# Patient Record
Sex: FEMALE | Race: WHITE | Employment: PART TIME | URBAN - NONMETROPOLITAN AREA
[De-identification: names, ages, dates, MRNs, and addresses within clinical notes are randomized per-mention and may not be internally consistent; named-entity substitution may affect disease eponyms.]

---

## 2019-10-02 ENCOUNTER — HOSPITAL ENCOUNTER (EMERGENCY)
Age: 64
Discharge: HOME | End: 2019-10-02
Attending: EMERGENCY MEDICINE

## 2019-10-02 VITALS
TEMPERATURE: 97.8 F | HEIGHT: 60 IN | SYSTOLIC BLOOD PRESSURE: 186 MMHG | OXYGEN SATURATION: 98 % | BODY MASS INDEX: 33.48 KG/M2 | HEART RATE: 93 BPM | DIASTOLIC BLOOD PRESSURE: 98 MMHG | RESPIRATION RATE: 16 BRPM

## 2019-10-02 DIAGNOSIS — B02.8 HERPES ZOSTER WITH OTHER COMPLICATION: Primary | ICD-10-CM

## 2019-10-02 PROCEDURE — 99282 EMERGENCY DEPT VISIT SF MDM: CPT

## 2019-10-02 RX ORDER — CEPHALEXIN 500 MG/1
500 CAPSULE ORAL 2 TIMES DAILY
Qty: 14 CAPSULE | Refills: 0 | Status: SHIPPED | OUTPATIENT
Start: 2019-10-02 | End: 2019-10-09

## 2019-10-02 RX ORDER — OXYCODONE HYDROCHLORIDE AND ACETAMINOPHEN 5; 325 MG/1; MG/1
1 TABLET ORAL EVERY 6 HOURS PRN
Qty: 12 TABLET | Refills: 0 | Status: SHIPPED | OUTPATIENT
Start: 2019-10-02 | End: 2019-10-05

## 2019-10-02 RX ORDER — GABAPENTIN 100 MG/1
100 CAPSULE ORAL 3 TIMES DAILY
Qty: 90 CAPSULE | Refills: 0 | Status: SHIPPED | OUTPATIENT
Start: 2019-10-02 | End: 2019-11-01

## 2019-10-02 RX ORDER — PREDNISONE 20 MG/1
20 TABLET ORAL DAILY
Qty: 5 TABLET | Refills: 0 | Status: SHIPPED | OUTPATIENT
Start: 2019-10-02 | End: 2019-10-07

## 2019-10-02 RX ORDER — VALACYCLOVIR HYDROCHLORIDE 1 G/1
1000 TABLET, FILM COATED ORAL 2 TIMES DAILY
Qty: 20 TABLET | Refills: 0 | Status: SHIPPED | OUTPATIENT
Start: 2019-10-02 | End: 2019-10-12

## 2019-10-02 ASSESSMENT — PAIN DESCRIPTION - PAIN TYPE: TYPE: ACUTE PAIN

## 2019-10-02 ASSESSMENT — PAIN DESCRIPTION - LOCATION: LOCATION: BACK;CHEST

## 2019-10-02 ASSESSMENT — PAIN DESCRIPTION - ORIENTATION: ORIENTATION: RIGHT

## 2019-10-02 ASSESSMENT — PAIN DESCRIPTION - DESCRIPTORS: DESCRIPTORS: SHARP;BURNING

## 2019-10-02 ASSESSMENT — PAIN SCALES - GENERAL: PAINLEVEL_OUTOF10: 10

## 2022-08-20 ENCOUNTER — APPOINTMENT (OUTPATIENT)
Dept: CT IMAGING | Age: 67
End: 2022-08-20

## 2022-08-20 ENCOUNTER — APPOINTMENT (OUTPATIENT)
Dept: GENERAL RADIOLOGY | Age: 67
DRG: 064 | End: 2022-08-20
Attending: INTERNAL MEDICINE

## 2022-08-20 ENCOUNTER — HOSPITAL ENCOUNTER (INPATIENT)
Age: 67
LOS: 3 days | DRG: 064 | End: 2022-08-23
Attending: INTERNAL MEDICINE | Admitting: INTERNAL MEDICINE

## 2022-08-20 ENCOUNTER — APPOINTMENT (OUTPATIENT)
Dept: CT IMAGING | Age: 67
DRG: 064 | End: 2022-08-20
Attending: INTERNAL MEDICINE

## 2022-08-20 ENCOUNTER — HOSPITAL ENCOUNTER (EMERGENCY)
Age: 67
Discharge: ANOTHER HOSPITAL | End: 2022-08-20
Attending: EMERGENCY MEDICINE

## 2022-08-20 ENCOUNTER — APPOINTMENT (OUTPATIENT)
Dept: GENERAL RADIOLOGY | Age: 67
End: 2022-08-20

## 2022-08-20 VITALS
RESPIRATION RATE: 16 BRPM | HEART RATE: 93 BPM | TEMPERATURE: 96.8 F | SYSTOLIC BLOOD PRESSURE: 132 MMHG | DIASTOLIC BLOOD PRESSURE: 95 MMHG | OXYGEN SATURATION: 96 %

## 2022-08-20 DIAGNOSIS — J81.0 ACUTE PULMONARY EDEMA (HCC): ICD-10-CM

## 2022-08-20 DIAGNOSIS — J96.01 ACUTE RESPIRATORY FAILURE WITH HYPOXIA AND HYPERCAPNIA (HCC): ICD-10-CM

## 2022-08-20 DIAGNOSIS — J96.02 ACUTE RESPIRATORY FAILURE WITH HYPOXIA AND HYPERCAPNIA (HCC): ICD-10-CM

## 2022-08-20 DIAGNOSIS — I62.9 INTRACRANIAL HEMORRHAGE (HCC): Primary | ICD-10-CM

## 2022-08-20 DIAGNOSIS — I21.3 ST ELEVATION MYOCARDIAL INFARCTION (STEMI), UNSPECIFIED ARTERY (HCC): ICD-10-CM

## 2022-08-20 PROBLEM — I63.9 ACUTE CEREBROVASCULAR ACCIDENT (CVA) OF BASAL GANGLIA (HCC): Status: ACTIVE | Noted: 2022-08-20

## 2022-08-20 LAB
A/G RATIO: 1.2 (ref 1.1–2.2)
ALBUMIN SERPL-MCNC: 4.3 G/DL (ref 3.4–5)
ALP BLD-CCNC: 80 U/L (ref 40–129)
ALT SERPL-CCNC: 41 U/L (ref 10–40)
AMORPHOUS: ABNORMAL /HPF
AMPHETAMINE SCREEN, URINE: NORMAL
ANION GAP SERPL CALCULATED.3IONS-SCNC: 9 MMOL/L (ref 3–16)
APTT: 24.9 SEC (ref 23–34.3)
AST SERPL-CCNC: 27 U/L (ref 15–37)
BACTERIA: ABNORMAL /HPF
BARBITURATE SCREEN URINE: NORMAL
BASE EXCESS ARTERIAL: -3.4 MMOL/L (ref -3–3)
BASE EXCESS ARTERIAL: 1 MMOL/L (ref -3–3)
BASE EXCESS ARTERIAL: 1.8 MMOL/L (ref -3–3)
BASE EXCESS VENOUS: -5.8 MMOL/L (ref -3–3)
BENZODIAZEPINE SCREEN, URINE: NORMAL
BILIRUB SERPL-MCNC: 0.7 MG/DL (ref 0–1)
BILIRUBIN URINE: NEGATIVE
BLOOD, URINE: ABNORMAL
BUN BLDV-MCNC: 23 MG/DL (ref 7–20)
CALCIUM SERPL-MCNC: 9.2 MG/DL (ref 8.3–10.6)
CANNABINOID SCREEN URINE: NORMAL
CARBOXYHEMOGLOBIN ARTERIAL: 1.5 % (ref 0–1.5)
CARBOXYHEMOGLOBIN ARTERIAL: 2.3 % (ref 0–1.5)
CARBOXYHEMOGLOBIN ARTERIAL: 2.9 % (ref 0–1.5)
CARBOXYHEMOGLOBIN: 4 % (ref 0–1.5)
CHLORIDE BLD-SCNC: 103 MMOL/L (ref 99–110)
CLARITY: CLEAR
CO2: 30 MMOL/L (ref 21–32)
COCAINE METABOLITE SCREEN URINE: NORMAL
COLOR: YELLOW
CREAT SERPL-MCNC: 0.8 MG/DL (ref 0.6–1.2)
EKG ATRIAL RATE: 132 BPM
EKG ATRIAL RATE: 95 BPM
EKG DIAGNOSIS: NORMAL
EKG DIAGNOSIS: NORMAL
EKG P AXIS: 52 DEGREES
EKG P AXIS: 91 DEGREES
EKG P-R INTERVAL: 126 MS
EKG P-R INTERVAL: 130 MS
EKG Q-T INTERVAL: 338 MS
EKG Q-T INTERVAL: 396 MS
EKG QRS DURATION: 90 MS
EKG QRS DURATION: 96 MS
EKG QTC CALCULATION (BAZETT): 497 MS
EKG QTC CALCULATION (BAZETT): 500 MS
EKG R AXIS: 88 DEGREES
EKG R AXIS: 90 DEGREES
EKG T AXIS: 79 DEGREES
EKG T AXIS: 88 DEGREES
EKG VENTRICULAR RATE: 132 BPM
EKG VENTRICULAR RATE: 95 BPM
EPITHELIAL CELLS, UA: ABNORMAL /HPF (ref 0–5)
ETHANOL: NORMAL MG/DL (ref 0–0.08)
GFR AFRICAN AMERICAN: >60
GFR NON-AFRICAN AMERICAN: >60
GLUCOSE BLD-MCNC: 110 MG/DL (ref 70–99)
GLUCOSE BLD-MCNC: 123 MG/DL (ref 70–99)
GLUCOSE BLD-MCNC: 258 MG/DL (ref 70–99)
GLUCOSE BLD-MCNC: 97 MG/DL (ref 70–99)
GLUCOSE URINE: 250 MG/DL
HCO3 ARTERIAL: 25.3 MMOL/L (ref 21–29)
HCO3 ARTERIAL: 26.6 MMOL/L (ref 21–29)
HCO3 ARTERIAL: 30 MMOL/L (ref 21–29)
HCO3 VENOUS: 27.5 MMOL/L (ref 23–29)
HCT VFR BLD CALC: 49.7 % (ref 36–48)
HEMOGLOBIN, ART, EXTENDED: 15.2 G/DL (ref 12–16)
HEMOGLOBIN, ART, EXTENDED: 15.9 G/DL (ref 12–16)
HEMOGLOBIN, ART, EXTENDED: 16.6 G/DL
HEMOGLOBIN: 15.8 G/DL (ref 12–16)
INR BLD: 0.98 (ref 0.87–1.14)
INR BLD: 0.98 (ref 0.87–1.14)
KETONES, URINE: NEGATIVE MG/DL
LACTIC ACID: 1.4 MMOL/L (ref 0.4–2)
LACTIC ACID: 2.1 MMOL/L (ref 0.4–2)
LEUKOCYTE ESTERASE, URINE: NEGATIVE
Lab: NORMAL
MAGNESIUM: 1.4 MG/DL (ref 1.8–2.4)
MCH RBC QN AUTO: 24.9 PG (ref 26–34)
MCHC RBC AUTO-ENTMCNC: 31.8 G/DL (ref 31–36)
MCV RBC AUTO: 78.3 FL (ref 80–100)
METHADONE SCREEN, URINE: NORMAL
METHEMOGLOBIN ARTERIAL: 0.1 %
METHEMOGLOBIN ARTERIAL: 0.1 % (ref 0–1.4)
METHEMOGLOBIN ARTERIAL: 0.3 %
METHEMOGLOBIN VENOUS: 0 %
MICROSCOPIC EXAMINATION: YES
NITRITE, URINE: NEGATIVE
O2 SAT, ARTERIAL: 95 % (ref 93–100)
O2 SAT, ARTERIAL: 95.2 %
O2 SAT, ARTERIAL: 99.7 %
O2 SAT, VEN: 79 %
O2 THERAPY: ABNORMAL
OPIATE SCREEN URINE: NORMAL
OXYCODONE URINE: NORMAL
PCO2 ARTERIAL: 45.4 MMHG (ref 35–45)
PCO2 ARTERIAL: 60.1 MMHG (ref 35–45)
PCO2 ARTERIAL: 61 MMHG (ref 35–45)
PCO2, VEN: 94.7 MMHG (ref 40–50)
PDW BLD-RTO: 16.2 % (ref 12.4–15.4)
PERFORMED ON: ABNORMAL
PERFORMED ON: ABNORMAL
PERFORMED ON: NORMAL
PH ARTERIAL: 7.24 (ref 7.35–7.45)
PH ARTERIAL: 7.31 (ref 7.35–7.45)
PH ARTERIAL: 7.38 (ref 7.35–7.45)
PH UA: 6.5
PH UA: 6.5 (ref 5–8)
PH VENOUS: 7.08 (ref 7.35–7.45)
PHENCYCLIDINE SCREEN URINE: NORMAL
PLATELET # BLD: 287 K/UL (ref 135–450)
PMV BLD AUTO: 7.7 FL (ref 5–10.5)
PO2 ARTERIAL: 351.5 MMHG (ref 75–108)
PO2 ARTERIAL: 77.5 MMHG (ref 75–108)
PO2 ARTERIAL: 85.7 MMHG (ref 75–108)
PO2, VEN: 59.9 MMHG (ref 25–40)
POTASSIUM REFLEX MAGNESIUM: 4.6 MMOL/L (ref 3.5–5.1)
PRO-BNP: ABNORMAL PG/ML (ref 0–124)
PROCALCITONIN: 0.26 NG/ML (ref 0–0.15)
PROPOXYPHENE SCREEN: NORMAL
PROTEIN UA: 100 MG/DL
PROTHROMBIN TIME: 12.8 SEC (ref 11.7–14.5)
PROTHROMBIN TIME: 12.9 SEC (ref 11.7–14.5)
RBC # BLD: 6.35 M/UL (ref 4–5.2)
RBC UA: ABNORMAL /HPF (ref 0–4)
SARS-COV-2, NAAT: NOT DETECTED
SODIUM BLD-SCNC: 142 MMOL/L (ref 136–145)
SPECIFIC GRAVITY UA: >=1.03 (ref 1–1.03)
TCO2 ARTERIAL: 27.2 MMOL/L
TCO2 ARTERIAL: 28 MMOL/L
TCO2 ARTERIAL: 32 MMOL/L
TCO2 CALC VENOUS: 30 MMOL/L
TOTAL CK: 45 U/L (ref 26–192)
TOTAL PROTEIN: 7.8 G/DL (ref 6.4–8.2)
TROPONIN: 0.05 NG/ML
TROPONIN: 0.07 NG/ML
TROPONIN: 0.54 NG/ML
TROPONIN: 1.08 NG/ML
URINE REFLEX TO CULTURE: ABNORMAL
URINE TYPE: ABNORMAL
UROBILINOGEN, URINE: 0.2 E.U./DL
WBC # BLD: 13 K/UL (ref 4–11)
WBC UA: ABNORMAL /HPF (ref 0–5)

## 2022-08-20 PROCEDURE — 82550 ASSAY OF CK (CPK): CPT

## 2022-08-20 PROCEDURE — 99255 IP/OBS CONSLTJ NEW/EST HI 80: CPT | Performed by: INTERNAL MEDICINE

## 2022-08-20 PROCEDURE — 6370000000 HC RX 637 (ALT 250 FOR IP): Performed by: EMERGENCY MEDICINE

## 2022-08-20 PROCEDURE — 6370000000 HC RX 637 (ALT 250 FOR IP): Performed by: STUDENT IN AN ORGANIZED HEALTH CARE EDUCATION/TRAINING PROGRAM

## 2022-08-20 PROCEDURE — 85610 PROTHROMBIN TIME: CPT

## 2022-08-20 PROCEDURE — 83880 ASSAY OF NATRIURETIC PEPTIDE: CPT

## 2022-08-20 PROCEDURE — 82077 ASSAY SPEC XCP UR&BREATH IA: CPT

## 2022-08-20 PROCEDURE — 2500000003 HC RX 250 WO HCPCS: Performed by: EMERGENCY MEDICINE

## 2022-08-20 PROCEDURE — 71045 X-RAY EXAM CHEST 1 VIEW: CPT

## 2022-08-20 PROCEDURE — 0BH17EZ INSERTION OF ENDOTRACHEAL AIRWAY INTO TRACHEA, VIA NATURAL OR ARTIFICIAL OPENING: ICD-10-PCS | Performed by: INTERNAL MEDICINE

## 2022-08-20 PROCEDURE — 2500000003 HC RX 250 WO HCPCS

## 2022-08-20 PROCEDURE — 2000000000 HC ICU R&B

## 2022-08-20 PROCEDURE — 83605 ASSAY OF LACTIC ACID: CPT

## 2022-08-20 PROCEDURE — 81001 URINALYSIS AUTO W/SCOPE: CPT

## 2022-08-20 PROCEDURE — 6360000002 HC RX W HCPCS

## 2022-08-20 PROCEDURE — 2500000003 HC RX 250 WO HCPCS: Performed by: STUDENT IN AN ORGANIZED HEALTH CARE EDUCATION/TRAINING PROGRAM

## 2022-08-20 PROCEDURE — 85027 COMPLETE CBC AUTOMATED: CPT

## 2022-08-20 PROCEDURE — 84484 ASSAY OF TROPONIN QUANT: CPT

## 2022-08-20 PROCEDURE — 5A1945Z RESPIRATORY VENTILATION, 24-96 CONSECUTIVE HOURS: ICD-10-PCS | Performed by: INTERNAL MEDICINE

## 2022-08-20 PROCEDURE — 93010 ELECTROCARDIOGRAM REPORT: CPT | Performed by: INTERNAL MEDICINE

## 2022-08-20 PROCEDURE — 93005 ELECTROCARDIOGRAM TRACING: CPT | Performed by: EMERGENCY MEDICINE

## 2022-08-20 PROCEDURE — 80053 COMPREHEN METABOLIC PANEL: CPT

## 2022-08-20 PROCEDURE — 80307 DRUG TEST PRSMV CHEM ANLYZR: CPT

## 2022-08-20 PROCEDURE — 6360000002 HC RX W HCPCS: Performed by: STUDENT IN AN ORGANIZED HEALTH CARE EDUCATION/TRAINING PROGRAM

## 2022-08-20 PROCEDURE — 70450 CT HEAD/BRAIN W/O DYE: CPT

## 2022-08-20 PROCEDURE — 51702 INSERT TEMP BLADDER CATH: CPT

## 2022-08-20 PROCEDURE — 6360000004 HC RX CONTRAST MEDICATION: Performed by: STUDENT IN AN ORGANIZED HEALTH CARE EDUCATION/TRAINING PROGRAM

## 2022-08-20 PROCEDURE — 94761 N-INVAS EAR/PLS OXIMETRY MLT: CPT

## 2022-08-20 PROCEDURE — 84145 PROCALCITONIN (PCT): CPT

## 2022-08-20 PROCEDURE — 31500 INSERT EMERGENCY AIRWAY: CPT

## 2022-08-20 PROCEDURE — 36415 COLL VENOUS BLD VENIPUNCTURE: CPT

## 2022-08-20 PROCEDURE — APPNB60 APP NON BILLABLE TIME 46-60 MINS: Performed by: NURSE PRACTITIONER

## 2022-08-20 PROCEDURE — 94640 AIRWAY INHALATION TREATMENT: CPT

## 2022-08-20 PROCEDURE — 82803 BLOOD GASES ANY COMBINATION: CPT

## 2022-08-20 PROCEDURE — 83735 ASSAY OF MAGNESIUM: CPT

## 2022-08-20 PROCEDURE — 99285 EMERGENCY DEPT VISIT HI MDM: CPT

## 2022-08-20 PROCEDURE — 2580000003 HC RX 258: Performed by: EMERGENCY MEDICINE

## 2022-08-20 PROCEDURE — 87635 SARS-COV-2 COVID-19 AMP PRB: CPT

## 2022-08-20 PROCEDURE — 70496 CT ANGIOGRAPHY HEAD: CPT

## 2022-08-20 PROCEDURE — 89220 SPUTUM SPECIMEN COLLECTION: CPT

## 2022-08-20 PROCEDURE — 94002 VENT MGMT INPAT INIT DAY: CPT

## 2022-08-20 PROCEDURE — 2700000000 HC OXYGEN THERAPY PER DAY

## 2022-08-20 PROCEDURE — 6360000002 HC RX W HCPCS: Performed by: EMERGENCY MEDICINE

## 2022-08-20 PROCEDURE — 2580000003 HC RX 258: Performed by: STUDENT IN AN ORGANIZED HEALTH CARE EDUCATION/TRAINING PROGRAM

## 2022-08-20 PROCEDURE — 85730 THROMBOPLASTIN TIME PARTIAL: CPT

## 2022-08-20 PROCEDURE — 96365 THER/PROPH/DIAG IV INF INIT: CPT

## 2022-08-20 PROCEDURE — 74018 RADEX ABDOMEN 1 VIEW: CPT

## 2022-08-20 RX ORDER — SUCCINYLCHOLINE CHLORIDE 20 MG/ML
INJECTION INTRAMUSCULAR; INTRAVENOUS
Status: DISCONTINUED
Start: 2022-08-20 | End: 2022-08-20

## 2022-08-20 RX ORDER — 0.9 % SODIUM CHLORIDE 0.9 %
1000 INTRAVENOUS SOLUTION INTRAVENOUS ONCE
Status: COMPLETED | OUTPATIENT
Start: 2022-08-20 | End: 2022-08-20

## 2022-08-20 RX ORDER — ONDANSETRON 4 MG/1
4 TABLET, ORALLY DISINTEGRATING ORAL EVERY 8 HOURS PRN
Status: DISCONTINUED | OUTPATIENT
Start: 2022-08-20 | End: 2022-08-23 | Stop reason: HOSPADM

## 2022-08-20 RX ORDER — HEPARIN SODIUM 10000 [USP'U]/100ML
INJECTION, SOLUTION INTRAVENOUS
Status: DISCONTINUED
Start: 2022-08-20 | End: 2022-08-20

## 2022-08-20 RX ORDER — MANNITOL 20 G/100ML
25 INJECTION, SOLUTION INTRAVENOUS ONCE
Status: COMPLETED | OUTPATIENT
Start: 2022-08-20 | End: 2022-08-20

## 2022-08-20 RX ORDER — IPRATROPIUM BROMIDE AND ALBUTEROL SULFATE 2.5; .5 MG/3ML; MG/3ML
1 SOLUTION RESPIRATORY (INHALATION) 4 TIMES DAILY
Status: DISCONTINUED | OUTPATIENT
Start: 2022-08-20 | End: 2022-08-22

## 2022-08-20 RX ORDER — ETOMIDATE 2 MG/ML
INJECTION INTRAVENOUS
Status: DISCONTINUED
Start: 2022-08-20 | End: 2022-08-20 | Stop reason: HOSPADM

## 2022-08-20 RX ORDER — SODIUM CHLORIDE 9 MG/ML
INJECTION, SOLUTION INTRAVENOUS PRN
Status: DISCONTINUED | OUTPATIENT
Start: 2022-08-20 | End: 2022-08-23 | Stop reason: HOSPADM

## 2022-08-20 RX ORDER — DEXTROSE MONOHYDRATE 25 G/50ML
12.5 INJECTION, SOLUTION INTRAVENOUS PRN
Status: DISCONTINUED | OUTPATIENT
Start: 2022-08-20 | End: 2022-08-20

## 2022-08-20 RX ORDER — POLYETHYLENE GLYCOL 3350 17 G/17G
17 POWDER, FOR SOLUTION ORAL DAILY PRN
Status: DISCONTINUED | OUTPATIENT
Start: 2022-08-20 | End: 2022-08-23 | Stop reason: HOSPADM

## 2022-08-20 RX ORDER — INSULIN LISPRO 100 [IU]/ML
0-4 INJECTION, SOLUTION INTRAVENOUS; SUBCUTANEOUS EVERY 6 HOURS
Status: DISCONTINUED | OUTPATIENT
Start: 2022-08-20 | End: 2022-08-23 | Stop reason: HOSPADM

## 2022-08-20 RX ORDER — METOPROLOL TARTRATE 5 MG/5ML
2.5 INJECTION INTRAVENOUS EVERY 4 HOURS
Status: DISCONTINUED | OUTPATIENT
Start: 2022-08-20 | End: 2022-08-23 | Stop reason: HOSPADM

## 2022-08-20 RX ORDER — DEXAMETHASONE SODIUM PHOSPHATE 4 MG/ML
10 INJECTION, SOLUTION INTRA-ARTICULAR; INTRALESIONAL; INTRAMUSCULAR; INTRAVENOUS; SOFT TISSUE ONCE
Status: COMPLETED | OUTPATIENT
Start: 2022-08-20 | End: 2022-08-20

## 2022-08-20 RX ORDER — HEPARIN SODIUM 5000 [USP'U]/ML
INJECTION, SOLUTION INTRAVENOUS; SUBCUTANEOUS
Status: DISCONTINUED
Start: 2022-08-20 | End: 2022-08-20

## 2022-08-20 RX ORDER — ATORVASTATIN CALCIUM 40 MG/1
40 TABLET, FILM COATED ORAL NIGHTLY
Status: DISCONTINUED | OUTPATIENT
Start: 2022-08-20 | End: 2022-08-23 | Stop reason: HOSPADM

## 2022-08-20 RX ORDER — ACETAMINOPHEN 325 MG/1
650 TABLET ORAL EVERY 6 HOURS PRN
Status: DISCONTINUED | OUTPATIENT
Start: 2022-08-20 | End: 2022-08-23 | Stop reason: HOSPADM

## 2022-08-20 RX ORDER — ONDANSETRON 2 MG/ML
4 INJECTION INTRAMUSCULAR; INTRAVENOUS EVERY 6 HOURS PRN
Status: DISCONTINUED | OUTPATIENT
Start: 2022-08-20 | End: 2022-08-23 | Stop reason: HOSPADM

## 2022-08-20 RX ORDER — MANNITOL 20 G/100ML
25 INJECTION, SOLUTION INTRAVENOUS ONCE
Status: COMPLETED | OUTPATIENT
Start: 2022-08-21 | End: 2022-08-21

## 2022-08-20 RX ORDER — DEXTROSE MONOHYDRATE 100 MG/ML
INJECTION, SOLUTION INTRAVENOUS CONTINUOUS PRN
Status: DISCONTINUED | OUTPATIENT
Start: 2022-08-20 | End: 2022-08-23 | Stop reason: HOSPADM

## 2022-08-20 RX ORDER — ROCURONIUM BROMIDE 10 MG/ML
INJECTION, SOLUTION INTRAVENOUS
Status: DISCONTINUED
Start: 2022-08-20 | End: 2022-08-20 | Stop reason: HOSPADM

## 2022-08-20 RX ORDER — ASPIRIN 300 MG/1
300 SUPPOSITORY RECTAL ONCE
Status: DISCONTINUED | OUTPATIENT
Start: 2022-08-20 | End: 2022-08-20

## 2022-08-20 RX ORDER — FUROSEMIDE 10 MG/ML
20 INJECTION INTRAMUSCULAR; INTRAVENOUS ONCE
Status: COMPLETED | OUTPATIENT
Start: 2022-08-20 | End: 2022-08-20

## 2022-08-20 RX ORDER — DEXAMETHASONE SODIUM PHOSPHATE 4 MG/ML
4 INJECTION, SOLUTION INTRA-ARTICULAR; INTRALESIONAL; INTRAMUSCULAR; INTRAVENOUS; SOFT TISSUE EVERY 6 HOURS
Status: DISCONTINUED | OUTPATIENT
Start: 2022-08-20 | End: 2022-08-22

## 2022-08-20 RX ORDER — ACETAMINOPHEN 650 MG/1
650 SUPPOSITORY RECTAL EVERY 6 HOURS PRN
Status: DISCONTINUED | OUTPATIENT
Start: 2022-08-20 | End: 2022-08-23 | Stop reason: HOSPADM

## 2022-08-20 RX ORDER — CIPROFLOXACIN HYDROCHLORIDE 3.5 MG/ML
2 SOLUTION/ DROPS TOPICAL ONCE
Status: COMPLETED | OUTPATIENT
Start: 2022-08-20 | End: 2022-08-20

## 2022-08-20 RX ORDER — SODIUM CHLORIDE 0.9 % (FLUSH) 0.9 %
5-40 SYRINGE (ML) INJECTION EVERY 12 HOURS SCHEDULED
Status: DISCONTINUED | OUTPATIENT
Start: 2022-08-20 | End: 2022-08-23 | Stop reason: HOSPADM

## 2022-08-20 RX ORDER — SODIUM CHLORIDE 0.9 % (FLUSH) 0.9 %
5-40 SYRINGE (ML) INJECTION PRN
Status: DISCONTINUED | OUTPATIENT
Start: 2022-08-20 | End: 2022-08-23 | Stop reason: HOSPADM

## 2022-08-20 RX ORDER — MAGNESIUM SULFATE IN WATER 40 MG/ML
2000 INJECTION, SOLUTION INTRAVENOUS
Status: COMPLETED | OUTPATIENT
Start: 2022-08-20 | End: 2022-08-21

## 2022-08-20 RX ADMIN — METOPROLOL TARTRATE 2.5 MG: 5 INJECTION, SOLUTION INTRAVENOUS at 20:10

## 2022-08-20 RX ADMIN — CIPROFLOXACIN 2 DROP: 3 SOLUTION OPHTHALMIC at 06:26

## 2022-08-20 RX ADMIN — FUROSEMIDE 20 MG: 10 INJECTION, SOLUTION INTRAMUSCULAR; INTRAVENOUS at 18:44

## 2022-08-20 RX ADMIN — MAGNESIUM SULFATE HEPTAHYDRATE 2000 MG: 2 INJECTION, SOLUTION INTRAVENOUS at 23:19

## 2022-08-20 RX ADMIN — DEXAMETHASONE SODIUM PHOSPHATE 4 MG: 4 INJECTION, SOLUTION INTRAMUSCULAR; INTRAVENOUS at 18:44

## 2022-08-20 RX ADMIN — IPRATROPIUM BROMIDE AND ALBUTEROL SULFATE 1 AMPULE: 2.5; .5 SOLUTION RESPIRATORY (INHALATION) at 20:17

## 2022-08-20 RX ADMIN — SODIUM CHLORIDE 1000 ML: 9 INJECTION, SOLUTION INTRAVENOUS at 05:46

## 2022-08-20 RX ADMIN — METOPROLOL TARTRATE 2.5 MG: 5 INJECTION, SOLUTION INTRAVENOUS at 13:51

## 2022-08-20 RX ADMIN — LEVETIRACETAM 1000 MG: 100 INJECTION, SOLUTION, CONCENTRATE INTRAVENOUS at 05:54

## 2022-08-20 RX ADMIN — NICARDIPINE HYDROCHLORIDE 3 MG/HR: 2.5 INJECTION, SOLUTION INTRAVENOUS at 08:30

## 2022-08-20 RX ADMIN — METOPROLOL TARTRATE 2.5 MG: 5 INJECTION, SOLUTION INTRAVENOUS at 18:43

## 2022-08-20 RX ADMIN — NICARDIPINE HYDROCHLORIDE 3 MG/HR: 2.5 INJECTION, SOLUTION INTRAVENOUS at 16:36

## 2022-08-20 RX ADMIN — SODIUM CHLORIDE 1000 MG: 9 INJECTION, SOLUTION INTRAVENOUS at 22:26

## 2022-08-20 RX ADMIN — DEXTROSE MONOHYDRATE 2.5 MG/HR: 50 INJECTION, SOLUTION INTRAVENOUS at 05:46

## 2022-08-20 RX ADMIN — ATORVASTATIN CALCIUM 40 MG: 40 TABLET, FILM COATED ORAL at 20:02

## 2022-08-20 RX ADMIN — IPRATROPIUM BROMIDE AND ALBUTEROL SULFATE 1 AMPULE: 2.5; .5 SOLUTION RESPIRATORY (INHALATION) at 15:21

## 2022-08-20 RX ADMIN — SODIUM CHLORIDE, PRESERVATIVE FREE 10 ML: 5 INJECTION INTRAVENOUS at 20:10

## 2022-08-20 RX ADMIN — MANNITOL 25 G: 20 INJECTION, SOLUTION INTRAVENOUS at 19:32

## 2022-08-20 RX ADMIN — MAGNESIUM SULFATE HEPTAHYDRATE 2000 MG: 2 INJECTION, SOLUTION INTRAVENOUS at 20:09

## 2022-08-20 RX ADMIN — IOPAMIDOL 100 ML: 755 INJECTION, SOLUTION INTRAVENOUS at 15:18

## 2022-08-20 RX ADMIN — DEXAMETHASONE SODIUM PHOSPHATE 10 MG: 4 INJECTION, SOLUTION INTRAMUSCULAR; INTRAVENOUS at 13:51

## 2022-08-20 RX ADMIN — SODIUM CHLORIDE, PRESERVATIVE FREE 10 ML: 5 INJECTION INTRAVENOUS at 09:39

## 2022-08-20 ASSESSMENT — ENCOUNTER SYMPTOMS
APNEA: 0
ANAL BLEEDING: 0
ABDOMINAL PAIN: 0
SHORTNESS OF BREATH: 1
ABDOMINAL DISTENTION: 0
EYE REDNESS: 1
EYE DISCHARGE: 1

## 2022-08-20 ASSESSMENT — PULMONARY FUNCTION TESTS
PIF_VALUE: 21
PIF_VALUE: 22
PIF_VALUE: 24
PIF_VALUE: 26
PIF_VALUE: 25
PIF_VALUE: 24
PIF_VALUE: 26
PIF_VALUE: 25
PIF_VALUE: 23
PIF_VALUE: 24
PIF_VALUE: 21
PIF_VALUE: 24
PIF_VALUE: 22
PIF_VALUE: 24
PIF_VALUE: 24
PIF_VALUE: 25
PIF_VALUE: 24
PIF_VALUE: 25
PIF_VALUE: 23
PIF_VALUE: 25
PIF_VALUE: 23
PIF_VALUE: 24
PIF_VALUE: 25
PIF_VALUE: 23
PIF_VALUE: 25
PIF_VALUE: 24
PIF_VALUE: 24
PIF_VALUE: 21
PIF_VALUE: 21
PIF_VALUE: 24

## 2022-08-20 ASSESSMENT — PAIN - FUNCTIONAL ASSESSMENT: PAIN_FUNCTIONAL_ASSESSMENT: NONE - DENIES PAIN

## 2022-08-20 NOTE — CONSULTS
[address]                [address]                                  CONSULTATION    PATIENT NAME: Andrew Campbell                    :        1955  MED REC NO:   [id number]                          ROOM:       4508  ACCOUNT NO:   [de-identified]                           ADMIT DATE: 2022  PROVIDER:     Bladimir Doran MD    CONSULT DATE:  2022    ATTENDING PROVIDER:  ICU. HISTORY OF PRESENT ILLNESS:  This patient is a 15-year-old woman who had  an acute event and was taken to the [facility] Emergency Room, had a CT  scan that showed intracranial hemorrhage, and also a STEMI in progress. The patient was intubated and transferred to the [facility] ICU  where she is stabilized. The patient is on a ventilator. The patient  has stabilized cardiac wise and the Cardiology team feels the  intracranial process takes priority over the cardiac issue, they do not at this point need to take her  to the cath lab. The patient is going to have a repeat CT at some  point, at which time she will require a CT angiogram as well. The  patient works as a nurses' aide. She smokes heavily. History provided  by her son. She has had no cardiac or cranial issues prior to this. Neurological examination, the patient has T-tube in place but she is  ventilating on her own. She does not open her eyes. She does not  follow commands. She withdraws somewhat to pain, more purposely on the  left than on the right, and has equal and reactive pupils, her corneals  appear to be intact. The patient's coag studies are completely normal.   The CT shows evidence of an acute intracranial hemorrhage in the deep  left frontal lobe, extending down toward the sylvian fissure, raising  the question of a vascular anomaly such as an aneurysm or malformation. There is midline shift.   There is mass effect but the basal cisterns are  open and there is loss of cortical sulcal topography. RECOMMENDATION:  Repeat CT with a CTA to determine if there is an  underlying aneurysm. At the present time, the patient's condition  appears to be somewhat stabilized, so I would not recommend any acute  operative intervention but continued medical management both  neurosurgically and cardiac wise. It is possible the patient may  require surgery, but it is indeterminate at this time. Should be placed  on Decadron, Levetiracetam/Keppra, and followed. Josh Duarte MD    D: 08/20/2022 13:05:38       T: 08/20/2022 14:49:27     [id number]  Job#: [id number]     Doc#: [id number]    CC:   Yash Hercules MD

## 2022-08-20 NOTE — CONSULTS
Neurology / Neurocritical Care Consult Note    Chauncey Youssef MD is requesting this consult. Reason for Consult: [facility]  Admission Chief Complaint: [facility]    History of Present Illness     Rosie Luciano is a 78 y.o. y/o female with PMH significant for right eye prosthetic, neck surgery, and heavy tobacco use who presented to the hospital early this morning () after being found unresponsive by her sister on the couch. Upon arrival her SpO2 was 70%. She had reportedly been dyspneic over the last two weeks. She does not receive routine medical care. EKG done in the ER showed STEMI. CT of the head showed a large left BG intraparenchymal hemorrhage. She was hypertensive upon arrival and thus nicardipine was initiated. She was intubated and sent to ICU for further management. Her exam has not significantly improved despite no sedation (other than what was given for intubation). Sheeba Vicente has 2 sons, her spouse is . Her sister is Rikki Pereira - [phone]. REVIEW OF SYSTEMS:   Unable to assess given mental status. Past Medical, Surgical, Family, and Social History   PAST MEDICAL HISTORY:  No past medical history on file. SURGICAL HISTORY:  Past Surgical History:   Procedure Laterality Date    EYE SURGERY      HYSTERECTOMY (CERVIX STATUS UNKNOWN)      NECK SURGERY       FAMILY HISTORY & SOCIAL HISTORY:  Family history non-contributory  No family history on file. Social History     Tobacco Use    Smoking status: Every Day     Packs/day: 1.50     Types: Cigarettes    Smokeless tobacco: Never   Substance Use Topics    Alcohol use: No     Allergies & Outpatient Medications   ALLERGIES:  No Known Allergies  HOME MEDICATIONS:  Current Discharge Medication List        CONTINUE these medications which have NOT CHANGED    Details   gabapentin (NEURONTIN) 100 MG capsule Take 1 capsule by mouth 3 times daily for 30 days.  Intended supply: 30 days  Qty: 90 capsule, Refills: 0           Physical Exam   PHYSICAL EXAM:  Vitals:    08/20/22 0743 08/20/22 0800 08/20/22 0815 08/20/22 0830   BP:  135/72 133/76 135/79   Pulse: (!) 106 91 87 86   Resp: 18      SpO2: 100% 95% 96% 97%             Mental status:    GCS   Best Eye Response  - No eye opening (1)    Best Verbal Response  - Intubated (T)    Best Motor response  - Localizes pain (5)      Does Not open eyes to auditory/tactile/painful stimulation     CN2: Visual Fields: no blink to either side with threat  CN 3,4,6: Extraocular muscles absent with doll's maneuver or tracking  Gaze is conjugate  Pupils equal, round, reactive to light  Right Pupil fixed - false eye  Left Pupil 3 to 2 mm and brisk  CN5: Corneal reflexes intact on the left (right eye false)  CN7: Face symmetric but exam limited by ET tube    CN9,11: Cough reflex present; gag reflex present    Motor Exam:  RUE: No movement  (0/5)  RLE: No movement (0/5)  LUE: Antigravity (3/5)  LLE: Flicker of movement (1/5)    Deep tendon reflexes:   Brisk throughout  Crossed adductors  Toes silent    Sensory:  Grimaces with pain in all four limbs  Responds with movement on the left (attempts to localize with LUE) when pain is applied to all four limbs     Tone: normal in all 4 extremities      OTHER SYSTEMS:  Cardiovascular: Warm, appears well perfused   Respiratory: Easy, non-labored respiratory pattern   Abdominal: Abdomen is without distention   Extremities: Upper and lower extremities are atraumatic in appearance without deformity. No swelling or erythema. Diagnostic Testing Results   IMAGES:  Images personally reviewed and agree w/ radiology interpretation. Head CT w/o Contrast  Acute intraparenchymal hemorrhage seen centered within the left basal ganglia suggestive of a hypertensive hemorrhage, measuring 3.0 x 6.1 x 3.9 cm in size with associated mild adjacent vasogenic edema, mass effect and minimal left-to-right midline shift measuring 4 mm. LABS:  All results below personally reviewed.  Pertinent positives & negatives are addressed in Impression & Recommendations below. LABS   Metabolic Panel Recent Labs     08/20/22  0447      K 4.6      CO2 30   BUN 23*   CREATININE 0.8   GLUCOSE 258*   CALCIUM 9.2   LABALBU 4.3   ALKPHOS 80   ALT 41*   AST 27      CBC / Coags Recent Labs     08/20/22 0447   WBC 13.0*   RBC 6.35*   HGB 15.8   HCT 49.7*      INR 0.98      Other Recent Labs     08/20/22  0640   COVID19 Not Detected   BNP 23K  Troponin 0.05  Toxicology negative  Recent Labs     08/20/22 0447   LACTA 2.1*        CURRENT SCHEDULED MEDICATIONS   Inpatient Medications     sodium chloride flush, 5-40 mL, IntraVENous, 2 times per day    insulin lispro, 0-4 Units, SubCUTAneous, Q6H    levETIRAcetam, 500 mg, IntraVENous, Q12H    metoprolol, 2.5 mg, IntraVENous, Q4H    atorvastatin, 40 mg, Orogastric, Nightly   Infusions    sodium chloride      niCARdipine 3 mg/hr (08/20/22 0830)    dextrose                IMPRESSION & RECOMMENDATIONS     IMPRESSION:  Ms. Yessi Cruz is a 78year old lady without routine medical care, with a significant smoking history and false right eye who presents with acutely altered mental status found to have large left BG ICH and STEMI. ICH SCORE:  Component Criteria Points   GCS 3-4 (2)  5-12 (1)  13-15 (0) 1   ICH Volume ? 30 ml (1)  <30 ml (0) 1   Intraventricular Hemorrhage Yes (1)  No (0) 0   Infratentorial Origin Yes (1)  No (0) 0   Age ? 80 (1)  <80 (0) 0   TOTALS POINTS:  2     RECOMMENDATIONS:   - Agree with cardiology consult given STEMI & Neurosurgery consultation    NEURO EXAMS:  Neurologic Exams Q1H  NIHSS on admission & Qshift    DIAGNOSTIC IMAGING  Repeat head CT in 6 hours for stability  MRI and vessel imaging (CT-A) when able. Will hold off on getting CT-A for now given potential need for coronary cath.     ICU MANAGEMENT  Airway Management  Supplemental O2 to maintain SaO2 > 95%  Titrate ventilator to maintain PaO2 >100 mm Hg  Keep PaCO2 Normalized  Sedatives and analgesics as indicated for mechanical ventilation  Some patients may NOT require sedatives due to mental status  Okay to discontinue sedation / analgesia if patient's mental status does not require. Monitor effects of sedation / analgesia on MAP   Sedation Vacations Q4H for neurologic exams  Q1H GCS / cranial nerve checks  Hemodynamic management  As initial SBP ?220 - Goal SBP ? 180 mmHg from neurologic perspective. May adjust to lower goal given STEMI. Goal to have blood pressure lowered to desired range within 1 hours of presentation  IV Intermittent dose: Labetalol 10-20mg  IV continuous infusion: Nicardipine 2.5mg - 20mg, titrate Q5 minutes to desired effect  IN ALL PATIENTS WITH SUSPECTED OR CONFIRMED ELEVATED ICP KEEP CEREBRAL PERFUSION PRESSURE >60 (MAP-ICP = CPP)  DVT Prophylaxis  SCDs bilateral Lower Extremities   24 hours after stable head CT may start DVT chemoprophylaxis   Preferred Heparin 5000 units SQ TID   Perform screening lower extremity dopplers ultrasounds if patient has profound hemiplegia or unable to mobilize within first 7 days of admission or when indicated for symptomatic DVT. General Care Issues  Glucose:  Initiate treatment for hyperglycemia. Goal glucose 110-180 mg/dL. Sodium:  Maintain in normal range (135 - 146 [place]/L). Magnesium: Maintain > 1.8 mg/dL. Heme: Keep Plts ? 100K, Keep INR ? 1.4  Temperature: Goal is normothermia. Culture for fever > 101.5 F  Elevated temperature can greatly affect mental status and increases metabolic demand, potentially worsening outcomes if left untreated. Treat fever aggressively   Nutrition: address within first 24 - 48 hours after admission.  Keep NPO while awaiting imaging and neurosurgical plan  PT/OT/SLP & PMR consult as indicated    JOE Ashby - CNP   Neurology & Neurocritical Care   Neurology Line: [phone]  [employer]: [facility] NPs  8/20/2022 9:12 AM    I spent 60 minutes in the care of this patient. Over 50% of that time was in face-to-face counseling regarding disease process, diagnostic testing, preventative measures, and answering patient and family questions.

## 2022-08-20 NOTE — ED PROVIDER NOTES
Emergency Department Physician Note     Location: [facility] EMERGENCY DEPARTMENT  8/20/2022    CHIEF COMPLAINT  Loss of Consciousness (Pt via squad for \"unresponsive\". Squad reports pt was \"found on the couch by sister, barely breathing, SPO2 70% on RA upon arrival.  Pt unresponsive upon arrival)      [address] [facility]  Virgil Acuña is a 78 y.o. female presents to the ED brought in by EMS, called for unresponsive patient, patient unable to give any history, obtained from EMS and her sister Rika Finch, I called and spoke to her via phone, there is no family member here in emergency department. 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 I have reviewed the following from the nursing documentation. History reviewed. No pertinent past medical history. Past Surgical History:   Procedure Laterality Date    EYE SURGERY      HYSTERECTOMY (CERVIX STATUS UNKNOWN)      NECK SURGERY       History reviewed. No pertinent family history.   Social History     Socioeconomic History    Marital status: Single     Spouse name: Not on file    Number of children: Not on file    Years of education: Not on file    Highest education level: Not on file   Occupational History    Not on file   Tobacco Use    Smoking status: Every Day     Packs/day: 1.50     Types: Cigarettes    Smokeless tobacco: Never   Substance and Sexual Activity    Alcohol use: No    Drug use: Not on file    Sexual activity: Not Currently   Other Topics Concern    Not on file   Social History Narrative    Not on file     Social Determinants of Health     Financial Resource Strain: Not on file   Food Insecurity: Not on file   Transportation Needs: Not on file   Physical Activity: Not on file   Stress: Not on file   Social Connections: Not on file   Intimate Partner Violence: Not on file   Housing Stability: Not on file     Current Facility-Administered Medications   Medication Dose Route Frequency Provider Last Rate Last Admin    etomidate (AMIDATE) 2 MG/ML injection             rocuronium (ZEMURON) 100 MG/10ML injection             niCARdipine (CARDENE) 25 mg in dextrose 5 % 250 mL infusion  2.5-15 mg/hr IntraVENous Continuous Eyad Nordmann, DO   Patient Transferred to Other Facility at 08/20/22 0700    iopamidol (ISOVUE-370) 76 % injection 75 mL  75 mL IntraVENous ONCE PRN Eyad Nordmann, DO         Current Outpatient Medications   Medication Sig Dispense Refill    gabapentin (NEURONTIN) 100 MG capsule Take 1 capsule by mouth 3 times daily for 30 days. Intended supply: 30 days 90 capsule 0     No Known Allergies    REVIEW OF SYSTEMS  Unable to obtain due to patient mentation  PHYSICAL EXAM   BP (!) 132/95   Pulse 93   Temp 96.8 °F (36 °C) (Oral)   Resp 16   SpO2 96%   GENERAL APPEARANCE: Unresponsive, in respiratory distress  HEAD: Normocephalic. Atraumatic. No samaniego's sign. EYES: PERRL. EOM's grossly intact. No scleral icterus. 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 No periorbital ecchymosis. ENT: Mucous membranes are moist.  Little foamy white discharge in the mouth, airway patent. No stridor. No epistaxis. No otorrhea or rhinorrhea. No hemotympanum  NECK: Supple.  No rigidity, trachea midline, no obvious masses  HEART: Tachycardic, regular rhythm, in the 130s, no murmurs  LUNGS: Respirations labored, tachypneic in the upper 30s, lungs are with bibasilar crackles, no wheezes or rhonchi  ABDOMEN: Soft. Non-distended. Rotund/obese, using abdominal muscles to breathe, no guarding, no rebound tenderness, no rigidity. Normal bowel sounds. EXTREMITIES: No peripheral edema. Calves symmetrical, no obvious deformities. Notable onychomycosis  SKIN: Moist/diaphoretic, cool mottled lower extremities and breast/abdomen, upper extremities warmer. No acute rashes. NEUROLOGICAL: She does grimace, gag reflex intact, no corneal reflex, aphasic, not following commands, nonverbal  PSYCHIATRIC: Normal mood and affect. RUPALI COMA SCALE:   1. EYE: +1 Does Not Open Eyes   2. VERBAL: +1 Makes no Noise   3. MOTOR: +3 Abnormal Flexion to Painful Stimuli-Decorticate    Score: 5 <7: Severe  NIH Stroke Scale      Interval: Baseline  Person Administering Scale: Dulcy Pastor, DO      1a  Level of consciousness: 3=responds only with reflex motor or automatic effects or totally unresponsive, flaccid, areflexic   1b. LOC questions:  2=Performs neither task correctly   1c. LOC commands: 2=Performs neither task correctly   2. Best Gaze: 2=forced deviation, or total gaze paresis not overcome by oculocephalic maneuver   3. Visual: 3=Bilateral hemianopia (blind including cortical blindness)   4. Facial Palsy: 3=Complete paralysis of one or both sides (absence of facial movement in the upper and lower face)   5a. Motor left arm: 4=No movement   5b. Motor right arm: 4=No movement   6a. motor left le=No movement   6b  Motor right le=No movement   7. Limb Ataxia: 0=Absent   8. Sensory: 2=Severe to total sensory loss; patient is not aware of being touched in face, arm, leg   9. Best Language:  3=Mute, global aphasia; no usable speech or auditory comprehension   10.  Dysarthria: 2=Severe; patient speech is so slurred as to be unintelligible in the absence of or our of proportion to any dysphagia, or is mute/anarthric   11. Extinction and Inattention: 2=Profound billie-inattention or billie-inattention to more than one modality. Does not recognize own hand or orients only to one side of space   12. Distal motor function: 2=No voluntary extension after 5 seconds. Movement of the fingers at another time are not scored    Total:   42     LABS  I have reviewed all labs for this visit.    Results for orders placed or performed during the hospital encounter of 08/20/22   COVID-19, Rapid    Specimen: Nasopharyngeal Swab   Result Value Ref Range    SARS-CoV-2, NAAT Not Detected Not Detected   Blood Gas, Venous   Result Value Ref Range    pH, Babak 7.081 (LL) 7.350 - 7.450    pCO2, Babak 94.7 (H) 40.0 - 50.0 mmHg    pO2, Babak 59.9 (H) 25.0 - 40.0 mmHg    HCO3, Venous 27.5 23.0 - 29.0 mmol/L    Base Excess, Babak -5.8 (L) -3.0 - 3.0 mmol/L    O2 Sat, Babak 79 Not Established %    Carboxyhemoglobin 4.0 (H) 0.0 - 1.5 %    MetHgb, Babak 0.0 <1.5 %    TC02 (Calc), Babak 30 Not Established mmol/L    O2 Therapy Unknown    CBC   Result Value Ref Range    WBC 13.0 (H) 4.0 - 11.0 K/uL    RBC 6.35 (H) 4.00 - 5.20 M/uL    Hemoglobin 15.8 12.0 - 16.0 g/dL    Hematocrit 49.7 (H) 36.0 - 48.0 %    MCV 78.3 (L) 80.0 - 100.0 fL    MCH 24.9 (L) 26.0 - 34.0 pg    MCHC 31.8 31.0 - 36.0 g/dL    RDW 16.2 (H) 12.4 - 15.4 %    Platelets 095 757 - 732 K/uL    MPV 7.7 5.0 - 10.5 fL   Comprehensive Metabolic Panel w/ Reflex to MG   Result Value Ref Range    Sodium 142 136 - 145 mmol/L    Potassium reflex Magnesium 4.6 3.5 - 5.1 mmol/L    Chloride 103 99 - 110 mmol/L    CO2 30 21 - 32 mmol/L    Anion Gap 9 3 - 16    Glucose 258 (H) 70 - 99 mg/dL    BUN 23 (H) 7 - 20 mg/dL    Creatinine 0.8 0.6 - 1.2 mg/dL    GFR Non-African American >60 >60    GFR African American >60 >60    Calcium 9.2 8.3 - 10.6 mg/dL    Total Protein 7.8 6.4 - 8.2 g/dL    Albumin 4.3 3.4 - 5.0 g/dL Albumin/Globulin Ratio 1.2 1.1 - 2.2    Total Bilirubin 0.7 0.0 - 1.0 mg/dL    Alkaline Phosphatase 80 40 - 129 U/L    ALT 41 (H) 10 - 40 U/L    AST 27 15 - 37 U/L   Lactic Acid   Result Value Ref Range    Lactic Acid 2.1 (H) 0.4 - 2.0 mmol/L   Troponin   Result Value Ref Range    Troponin 0.07 (H) <0.01 ng/mL   Brain Natriuretic Peptide   Result Value Ref Range    Pro-BNP 23,393 (H) 0 - 124 pg/mL   Blood gas, arterial   Result Value Ref Range    pH, Arterial 7.236 (L) 7.350 - 7.450    pCO2, Arterial 61.0 (H) 35.0 - 45.0 mmHg    pO2, Arterial 351.5 (H) 75.0 - 108.0 mmHg    HCO3, Arterial 25.3 21.0 - 29.0 mmol/L    Base Excess, Arterial -3.4 (L) -3.0 - 3.0 mmol/L    Hemoglobin, Art, Extended 15.2 12.0 - 16.0 g/dL    O2 Sat, Arterial 99.7 >92 %    Carboxyhgb, Arterial 2.9 (H) 0.0 - 1.5 %    Methemoglobin, Arterial 0.1 <1.5 %    TCO2, Arterial 27.2 Not Established mmol/L    O2 Therapy Unknown    Protime-INR   Result Value Ref Range    Protime 12.9 11.7 - 14.5 sec    INR 0.98 0.87 - 1.14   APTT   Result Value Ref Range    aPTT 24.9 23.0 - 34.3 sec   Blood gas, arterial   Result Value Ref Range    pH, Arterial 7.385 7.350 - 7.450    pCO2, Arterial 45.4 (H) 35.0 - 45.0 mmHg    pO2, Arterial 77.5 75.0 - 108.0 mmHg    HCO3, Arterial 26.6 21.0 - 29.0 mmol/L    Base Excess, Arterial 1.0 -3.0 - 3.0 mmol/L    Hemoglobin, Art, Extended 15.9 12.0 - 16.0 g/dL    O2 Sat, Arterial 95.2 >92 %    Carboxyhgb, Arterial 2.3 (H) 0.0 - 1.5 %    Methemoglobin, Arterial 0.3 <1.5 %    TCO2, Arterial 28.0 Not Established mmol/L    O2 Therapy Unknown    Troponin   Result Value Ref Range    Troponin 0.05 (H) <0.01 ng/mL   Urinalysis with Reflex to Culture    Specimen: Urine, clean catch   Result Value Ref Range    Color, UA Yellow Straw/Yellow    Clarity, UA Clear Clear    Glucose, Ur 250 (A) Negative mg/dL    Bilirubin Urine Negative Negative    Ketones, Urine Negative Negative mg/dL    Specific Gravity, UA >=1.030 1.005 - 1.030    Blood, Urine SMALL (A) Negative    pH, UA 6.5 5.0 - 8.0    Protein,  (A) Negative mg/dL    Urobilinogen, Urine 0.2 <2.0 E.U./dL    Nitrite, Urine Negative Negative    Leukocyte Esterase, Urine Negative Negative    Microscopic Examination YES     Urine Type NotGiven     Urine Reflex to Culture Not Indicated    Drug screen multi urine   Result Value Ref Range    Amphetamine Screen, Urine Neg Negative <1000ng/mL    Barbiturate Screen, Ur Neg Negative <200 ng/mL    Benzodiazepine Screen, Urine Neg Negative <200 ng/mL    Cannabinoid Scrn, Ur Neg Negative <50 ng/mL    Cocaine Metabolite Screen, Urine Neg Negative <300 ng/mL    Opiate Scrn, Ur Neg Negative <300 ng/mL    PCP Screen, Urine Neg Negative <25 ng/mL    Methadone Screen, Urine Neg Negative <300 ng/mL    Propoxyphene Scrn, Ur Neg Negative <300 ng/mL    Oxycodone Urine Neg Negative <100 ng/ml    pH, UA 6.5     Drug Screen Comment: see below    Ethanol   Result Value Ref Range    Ethanol Lvl None Detected mg/dL   CK   Result Value Ref Range    Total CK 45 26 - 192 U/L   Microscopic Urinalysis   Result Value Ref Range    WBC, UA 3-5 0 - 5 /HPF    RBC, UA 5-10 (A) 0 - 4 /HPF    Epithelial Cells, UA 2-5 0 - 5 /HPF    Bacteria, UA Rare (A) None Seen /HPF    Amorphous, UA Rare /HPF   EKG 12 Lead   Result Value Ref Range    Ventricular Rate 132 BPM    Atrial Rate 132 BPM    P-R Interval 126 ms    QRS Duration 96 ms    Q-T Interval 338 ms    QTc Calculation (Bazett) 500 ms    P Axis 91 degrees    R Axis 90 degrees    T Axis 79 degrees    Diagnosis       ** Suspect arm lead reversal, interpretation assumes no reversalSinus tachycardiaBiatrial enlargementRightward axisPulmonary disease patternLeft ventricular hypertrophyST elevation consider anterolateral injury or acute infarctST elevation consider inferior injury or acute infarct** ** ACUTE MI / STEMI ** **Abnormal ECGNo previous ECGs available     EKG 12 Lead   Result Value Ref Range    Ventricular Rate 95 BPM    Atrial Rate 95 BPM    P-R Interval 130 ms    QRS Duration 90 ms    Q-T Interval 396 ms    QTc Calculation (Bazett) 497 ms    P Axis 52 degrees    R Axis 88 degrees    T Axis 88 degrees    Diagnosis       Normal sinus rhythmMinimal voltage criteria for LVH, may be normal variantAnterior infarct , age undeterminedAbnormal ECGWhen compared with ECG of 20-AUG-2022 04:49, (unconfirmed)ST no longer elevated in Lateral leadsNonspecific T wave abnormality now evident in Inferior leads           EKG interpretations by me:  Initial EKG at 0 449 just after intubation with heart rate 132, QTc 500, ST elevation, anterolateral ischemia, STEMI  Repeat EKG at 0 617 with normal sinus rhythm, LVH criteria/left axis deviation, rate 95, QTc 497, appearance of anterior infarct, but the ST elevations had improved    RADIOLOGY  CT HEAD WO CONTRAST    Result Date: 8/20/2022  EXAMINATION: CT OF THE HEAD WITHOUT CONTRAST  8/20/2022 5:17 am TECHNIQUE: CT of the head was performed without the administration of intravenous contrast. Automated exposure control, iterative reconstruction, and/or weight based adjustment of the mA/kV was utilized to reduce the radiation dose to as low as reasonably achievable. COMPARISON: None. HISTORY: ORDERING SYSTEM PROVIDED HISTORY: HEAD INJURY MODERATE OR SEVERE ACUTE, STABLE TECHNOLOGIST PROVIDED HISTORY: Has a \"code stroke\" or \"stroke alert\" been called? ->No Reason for exam:->fall, AMS Decision Support Exception - unselect if not a suspected or confirmed emergency medical condition->Emergency Medical Condition (MA) Reason for Exam: unresponsive FINDINGS: BRAIN/VENTRICLES: There is a large intraparenchymal hemorrhage seen centered within the left basal ganglia, with the hemorrhage measuring approximately 3.0 x 6.1 x 3.9 cm in transverse, AP, and craniocaudal dimensions respectively.   There is a mild amount of surrounding vasogenic edema adjacent to the hemorrhage, as well as associated mass effect and minimal left-to-right midline shift measuring approximately 4 mm. No downward herniation is identified. Basilar cisterns are patent. Intracranial atherosclerosis is identified. Patchy white matter low attenuation suggestive of chronic microvascular ischemic change. No extra-axial hemorrhage is identified. ORBITS: No acute abnormality is identified. A prosthetic is noted within the right orbit. SINUSES: No acute air-fluid level seen within the paranasal sinuses or mastoid air cells. The patient is intubated. A small amount of fluid is seen within the posterior nasopharynx and oropharynx. SOFT TISSUES/SKULL:  No acute abnormality of the visualized skull or soft tissues. Acute intraparenchymal hemorrhage seen centered within the left basal ganglia suggestive of a hypertensive hemorrhage, measuring 3.0 x 6.1 x 3.9 cm in size with associated mild adjacent vasogenic edema, mass effect and minimal left-to-right midline shift measuring 4 mm. This was discussed with the ordering provider Anitra Rivera at 5:40 a.m. on 08/20/2022. XR CHEST PORTABLE    Result Date: 8/20/2022  EXAMINATION: ONE XRAY VIEW OF THE CHEST 8/20/2022 5:16 am COMPARISON: None. HISTORY: ORDERING SYSTEM PROVIDED HISTORY: AMS, hypoxia TECHNOLOGIST PROVIDED HISTORY: Reason for exam:->AMS, hypoxia Reason for Exam: tube placement FINDINGS: The endotracheal tube appears to measure approximately 2 cm above the annemarie. The enteric catheter courses below the level the film. Cardiac size appears normal.  Patchy infiltrates are seen within the lungs bilaterally, with a small right-sided pleural effusion. No pneumothorax is identified. Degenerative changes are seen within the spine. Multifocal infiltrates seen throughout the lungs bilaterally with a small right-sided pleural effusion concerning for multi lobar pneumonia, though pulmonary edema could have a similar appearance. The endotracheal tube is felt to measure approximately 2 cm above the annemarie.  Enteric catheter terminates below the level the film. Patient Name: Virgil Acuña   Medical Record Number: [id number]   Room/Bed: 01/01  Intubation Procedure Note  Indication: impending respiratory failure and hypoxia    Consent: Unable to be obtained due to the emergent nature of this procedure. Medications Used: etomidate 20mg intravenously and rocuronium 100mg intravenously    Procedure: The patient was placed in the appropriate position. Cricoid pressure was utilized. Intubation was performed via Glidescope a 7.5 cuffed endotracheal tube. She did require some suctioning of clear frothy secretions of oropharynx. The cuff was then inflated and the tube was secured appropriately at a distance of 23 cm to the dental ridge. Initial confirmation of placement included bilateral breath sounds, an end tidal CO2 detector, absence of sounds over the stomach, tube fogging, adequate chest rise, adequate pulse oximetry reading, and improved skin color. A chest x-ray to verify correct placement of the tube showed appropriate tube position. The patient tolerated the procedure well. Complications: None  EBL 0      ED COURSE/MDM  Patient seen and evaluated. Old records reviewed. Labs and imaging reviewed and results discussed with patient.      78 y.o. female with unresponsiveness, she was hypoxic around 70% with respiratory distress, tachypneic in the 30s on arrival, still only 80% after being placed on 15L oxygen mask, she was promptly intubated and work-up initiated, EKG obtained which noted a STEMI, called and spoke to Dr. Jorge Harrison cardiology, given her comorbid conditions, he felt she needed further evaluation for the altered mental status prior to considering intervention, wanted her to be sent for head CT before considering transfer, unfortunately there was an intracranial hemorrhage found, large intraparenchymal left-sided hemorrhage, I had initially ordered aspirin, Brilinta, heparin and these were promptly canceled when I reviewed the CT scan when it was being performed, I called and spoke to the radiologist to agreed this was a bleed, discussed with Dr. Barron Ulrich with  stroke team, she recommended nicardipine drip, patient was rather hypertensive on arrival, this did improve some after intubation, but was still hypertensive and this did improve with the nicardipine, goal was systolic less than 605, patient given Keppra, and called neurosurgery, spoke to Dr. Jeff Parrish, he recommended transfer to [facility] ICU, she was given 1 L bolus fluids, however upon visualizing the chest x-ray after intubation, I have concerns for pulmonary edema, she did have a notably elevated BNP, and some white foamy discharge coming from her mouth, incidentally she was found to have right eye prosthesis, there was a lot of purulent drainage from the right eye, this was cleaned and antibiotic drops placed, I discussed all this with Dr. Bhupinder Barr hospitalist at [facility], she agreed to accept for admission, I later called Dr. Low Miller cardiology service at [facility] to update him on the patient who is being transferred there, her troponin did improve from 0.07 to 0.05, but given her  [address], she would not be a candidate to take directly to cath lab, I made some vent adjustments and her blood gases notably improved, she was transported via air care to [facility]. I did update the family, tried to explain the poor prognosis, but they did not seem to comprehend the critical nature of her current condition. She is full code at this time. She does have 2 sons, her spouse is , they would be the decision makers, but point of contact was Isabella Murry her sister, [phone].     Orders Placed This Encounter   Procedures    COVID-19, Rapid    XR CHEST PORTABLE    CT HEAD WO CONTRAST    Blood Gas, Venous    CBC    Comprehensive Metabolic Panel w/ Reflex to MG    Lactic Acid    Troponin    Brain Natriuretic Peptide    Blood gas, arterial separately billable procedures. This includes time at the bedside, data interpretation, medication management, obtaining critical history from collateral sources if the patient is unable to provide it directly, and physician consultation. Specifics of interventions taken and potentially life-threatening diagnostic considerations are listed in the medical decision making. CLINICAL IMPRESSION  1. Intracranial hemorrhage ([facility] [address]    2. ST elevation myocardial infarction (STEMI), unspecified artery ([facility] [address]    3. Acute respiratory failure with hypoxia and hypercapnia (HCC)    4. Acute pulmonary edema (HCC)        Blood pressure (!) 132/95, pulse 93, temperature 96.8 °F (36 °C), temperature source Oral, resp. rate 16, SpO2 96 %. DISPOSITION  Olvin  was transferred [facility] in critical condition.                    Beatris Ramirez, DO  08/20/22 [address] [facility],   08/20/22 3983

## 2022-08-20 NOTE — ED NOTES
Etomidate 20mg and Stephane 100mg IVP adm for sedation for ETT placement.      Elias Uche, [address] [facility]  08/20/22 3724

## 2022-08-20 NOTE — CONSULTS
Reason for Consultation/Chief Complaint:  unresponsive    History of Present Illness:  Hannah Nunez is a 78 y.o. patient whom we were asked to see for STEMI/CVA. Pt intubated/unresponsive. Brought to ER in [place] by EMS. Reportedly sob over past 1-2 wks. More sob through night and became less responsive. Stayed on couch. Found on couch unresponsive. No hx cp/head injury. Noted on EKG to have ST elevation anterolaterally. CT however showed large left intrparenchymal hemorrhage. HTNsive on arrival.  Started on nicardipine. EKG improved with Tx of HTN. Intial trops 0.07>0. 05. Intubated in ER. Flown to [facility]. Past Medical History:   has no past medical history on file. Surgical History:   has a past surgical history that includes Hysterectomy; Neck surgery; and Eye surgery. Social History:   reports that she has been smoking cigarettes. She has been smoking an average of 1.5 packs per day. She has never used smokeless tobacco. She reports that she does not drink alcohol. Family History:  No evidence for sudden cardiac death or premature CAD    Home Medications:  Were reviewed and are listed in nursing record. and/or listed below  Prior to Admission medications    Medication Sig Start Date End Date Taking? Authorizing Provider   gabapentin (NEURONTIN) 100 MG capsule Take 1 capsule by mouth 3 times daily for 30 days. Intended supply: 30 days 10/2/19 [birth year]  Amos Urban MD        Allergies:  Patient has no known allergies.      Review of Systems:   Unable, intubated, unrespnsive    Physical Examination:    Vitals:    08/20/22 0830   BP: 135/79   Pulse: 86   Resp:    SpO2: 97%              General Appearance:  Intubated, appears stated age   Head:  Normocephalic, without obvious abnormality, atraumatic   Eyes:  PERRL, conjunctiva/corneas clear       Nose: Nares normal, no drainage or sinus tenderness   Throat: Orally intubated   Neck: Supple, symmetrical, trachea midline       Lungs: Clear to auscultation bilaterally, respirations unlabored   Chest Wall:  No tenderness or deformity   Heart:  Regular rate and rhythm, S1, S2 normal, no murmur, rub or gallop   Abdomen:   Soft,  bowel sounds active all four quadrants           Extremities: Extremities normal, atraumatic, no cyanosis or edema   Pulses: 2+ and symmetric   Skin: Skin color, texture, turgor normal, no rashes or lesions   Pysch: unable   Neurologic: Unresponsive. Labs  CBC:   Lab Results   Component Value Date/Time    WBC 13.0 08/20/2022 04:47 AM    RBC 6.35 08/20/2022 04:47 AM    HGB 15.8 08/20/2022 04:47 AM    HCT 49.7 08/20/2022 04:47 AM    MCV 78.3 08/20/2022 04:47 AM    RDW 16.2 08/20/2022 04:47 AM     08/20/2022 04:47 AM     CMP:    Lab Results   Component Value Date/Time     08/20/2022 04:47 AM    K 4.6 08/20/2022 04:47 AM     08/20/2022 04:47 AM    CO2 30 08/20/2022 04:47 AM    BUN 23 08/20/2022 04:47 AM    CREATININE 0.8 08/20/2022 04:47 AM    GFRAA >60 08/20/2022 04:47 AM    AGRATIO 1.2 08/20/2022 04:47 AM    LABGLOM >60 08/20/2022 04:47 AM    GLUCOSE 258 08/20/2022 04:47 AM    PROT 7.8 08/20/2022 04:47 AM    CALCIUM 9.2 08/20/2022 04:47 AM    BILITOT 0.7 08/20/2022 04:47 AM    ALKPHOS 80 08/20/2022 04:47 AM    AST 27 08/20/2022 04:47 AM    ALT 41 08/20/2022 04:47 AM     PT/INR:  No results found for: PTINR  Lab Results   Component Value Date    CKTOTAL 45 08/20/2022    TROPONINI 0.05 (H) 08/20/2022       EKG:  I have reviewed EKG with the following interpretation:  Impression: Personally reviewed,  ST, LVH by voltage, ST elevation anterolateral.    Assessment  Patient Active Problem List   Diagnosis    STEMI (ST elevation myocardial infarction) ([facility] [address]      CVA    Plan:    Large intraparenchymal hemorrhage likely related to Severe hypertension. Had ST elevation on initial EKG with improvement with tx HTN. Trop trending down.   Unfortunately we have nothing to offer for cardiac situation with large intraparenchyal hemorrhage. Unforutunately cannot use anti platelets let along high dose anticoagulation etc required for intervention. B blocker therapy IV for bp/cardioprotection. Statin. Echo.

## 2022-08-20 NOTE — CONSULTS
Neurology / Neurocritical Care Consult Note    Janice Chang MD is requesting this consult. Reason for Consult: IPH  Admission Chief Complaint: [facility]    History of Present Illness     Artem Mack is a 78 y.o. y/o female with PMH significant for right eye prosthetic, neck surgery, and heavy tobacco use who presented to the hospital early this morning () after being found unresponsive by her sister on the couch. Upon arrival her SpO2 was 70%. She had reportedly been dyspneic over the last two weeks. She does not receive routine medical care. EKG done in the ER showed STEMI. CT of the head showed a large left BG intraparenchymal hemorrhage. She was hypertensive upon arrival and thus nicardipine was initiated. She was intubated and sent to ICU for further management. Her exam has not significantly improved despite no sedation (other than what was given for intubation). Ana Eng has 2 sons, her spouse is . Her sister is Franco Bergman - [phone]. REVIEW OF SYSTEMS:   Unable to assess given mental status. Past Medical, Surgical, Family, and Social History   PAST MEDICAL HISTORY:  No past medical history on file. SURGICAL HISTORY:  Past Surgical History:   Procedure Laterality Date    EYE SURGERY      HYSTERECTOMY (CERVIX STATUS UNKNOWN)      NECK SURGERY       FAMILY HISTORY & SOCIAL HISTORY:  Family history non-contributory  No family history on file. Social History     Tobacco Use    Smoking status: Every Day     Packs/day: 1.50     Types: Cigarettes    Smokeless tobacco: Never   Substance Use Topics    Alcohol use: No     Allergies & Outpatient Medications   ALLERGIES:  No Known Allergies  HOME MEDICATIONS:  Current Discharge Medication List        CONTINUE these medications which have NOT CHANGED    Details   gabapentin (NEURONTIN) 100 MG capsule Take 1 capsule by mouth 3 times daily for 30 days.  Intended supply: 30 days  Qty: 90 capsule, Refills: 0           Physical Exam   PHYSICAL EXAM:  Vitals:    08/20/22 0743 08/20/22 0800 08/20/22 0815 08/20/22 0830   BP:  135/72 133/76 135/79   Pulse: (!) 106 91 87 86   Resp: 18      SpO2: 100% 95% 96% 97%             Mental status:    GCS   Best Eye Response  - No eye opening (1)    Best Verbal Response  - Intubated (T)    Best Motor response  - Localizes pain (5)      Does Not open eyes to auditory/tactile/painful stimulation     CN2: Visual Fields: no blink to either side with threat  CN 3,4,6: Extraocular muscles absent with doll's maneuver or tracking  Gaze is conjugate  Pupils equal, round, reactive to light  Right Pupil fixed - false eye  Left Pupil 3 to 2 mm and brisk  CN5: Corneal reflexes intact on the left (right eye false)  CN7: Face symmetric but exam limited by ET tube    CN9,11: Cough reflex present; gag reflex present    Motor Exam:  RUE: No movement  (0/5)  RLE: No movement (0/5)  LUE: Antigravity (3/5)  LLE: Flicker of movement (1/5)    Deep tendon reflexes:   Brisk throughout  Crossed adductors  Toes silent    Sensory:  Grimaces with pain in all four limbs  Responds with movement on the left (attempts to localize with LUE) when pain is applied to all four limbs     Tone: normal in all 4 extremities      OTHER SYSTEMS:  Cardiovascular: Warm, appears well perfused   Respiratory: Easy, non-labored respiratory pattern   Abdominal: Abdomen is without distention   Extremities: Upper and lower extremities are atraumatic in appearance without deformity. No swelling or erythema. Diagnostic Testing Results   IMAGES:  Images personally reviewed and agree w/ radiology interpretation. Head CT w/o Contrast  Acute intraparenchymal hemorrhage seen centered within the left basal ganglia suggestive of a hypertensive hemorrhage, measuring 3.0 x 6.1 x 3.9 cm in size with associated mild adjacent vasogenic edema, mass effect and minimal left-to-right midline shift measuring 4 mm. LABS:  All results below personally reviewed.  Pertinent positives & negatives are addressed in Impression & Recommendations below. LABS   Metabolic Panel Recent Labs     08/20/22  0447      K 4.6      CO2 30   BUN 23*   CREATININE 0.8   GLUCOSE 258*   CALCIUM 9.2   LABALBU 4.3   ALKPHOS 80   ALT 41*   AST 27        CBC / Coags Recent Labs     08/20/22 0447 08/20/22  0930   WBC 13.0*  --    RBC 6.35*  --    HGB 15.8  --    HCT 49.7*  --      --    INR 0.98 0.98        Other Recent Labs     08/20/22  0640   COVID19 Not Detected     BNP 23K  Troponin 0.05  Toxicology negative  Recent Labs     08/20/22  0930   LACTA 1.4          CURRENT SCHEDULED MEDICATIONS   Inpatient Medications     sodium chloride flush, 5-40 mL, IntraVENous, 2 times per day    insulin lispro, 0-4 Units, SubCUTAneous, Q6H    levETIRAcetam, 500 mg, IntraVENous, Q12H    metoprolol, 2.5 mg, IntraVENous, Q4H    atorvastatin, 40 mg, Orogastric, Nightly   Infusions    sodium chloride      niCARdipine 3 mg/hr (08/20/22 0830)    dextrose                IMPRESSION & RECOMMENDATIONS     IMPRESSION:  Ms. Srikanth Pathak is a 78year old lady without routine medical care, with a significant smoking history and false right eye who presents with acutely altered mental status found to have large left BG ICH and STEMI. ICH SCORE:  Component Criteria Points   GCS 3-4 (2)  5-12 (1)  13-15 (0) 1   ICH Volume ? 30 ml (1)  <30 ml (0) 1   Intraventricular Hemorrhage Yes (1)  No (0) 0   Infratentorial Origin Yes (1)  No (0) 0   Age ? [de-identified] (1)  <80 (0) 0   TOTALS POINTS:  2     RECOMMENDATIONS:   - Agree with cardiology consult given STEMI & Neurosurgery consultation  - No anticoagulation or antiplatelets for now given large ICH    NEURO EXAMS:  Neurologic Exams Q1H  NIHSS on admission & Qshift    DIAGNOSTIC IMAGING  Repeat head CT in 6 hours for stability  MRI and vessel imaging (CT-A) when able. Will hold off on getting CT-A for now given potential need for coronary cath.     ICU MANAGEMENT  Airway Management  Supplemental O2 to maintain SaO2 > 95%  Titrate ventilator to maintain PaO2 >100 mm Hg  Keep PaCO2 Normalized  Sedatives and analgesics as indicated for mechanical ventilation  Some patients may NOT require sedatives due to mental status  Okay to discontinue sedation / analgesia if patient's mental status does not require. Monitor effects of sedation / analgesia on MAP   Sedation Vacations Q4H for neurologic exams  Q1H GCS / cranial nerve checks  Hemodynamic management  As initial SBP ?220 - Goal SBP ? 180 mmHg from neurologic perspective. May adjust to lower goal given STEMI. Goal to have blood pressure lowered to desired range within 1 hours of presentation  IV Intermittent dose: Labetalol 10-20mg  IV continuous infusion: Nicardipine 2.5mg - 20mg, titrate Q5 minutes to desired effect  IN ALL PATIENTS WITH SUSPECTED OR CONFIRMED ELEVATED ICP KEEP CEREBRAL PERFUSION PRESSURE >60 (MAP-ICP = CPP)  DVT Prophylaxis  SCDs bilateral Lower Extremities   24 hours after stable head CT may start DVT chemoprophylaxis   Preferred Heparin 5000 units SQ TID   Perform screening lower extremity dopplers ultrasounds if patient has profound hemiplegia or unable to mobilize within first 7 days of admission or when indicated for symptomatic DVT. General Care Issues  Glucose:  Initiate treatment for hyperglycemia. Goal glucose 110-180 mg/dL. Sodium:  Maintain in normal range (135 - 146 [place]/L). Magnesium: Maintain > 1.8 mg/dL. Heme: Keep Plts ? 100K, Keep INR ? 1.4  Temperature: Goal is normothermia. Culture for fever > 101.5 F  Elevated temperature can greatly affect mental status and increases metabolic demand, potentially worsening outcomes if left untreated. Treat fever aggressively   Nutrition: address within first 24 - 48 hours after admission.  Keep NPO while awaiting imaging and neurosurgical plan  PT/OT/SLP & PMR consult as indicated    JOE Lynch - CNP   Neurology & Neurocritical Care   Neurology Line: [phone]  [facility]: [facility] NPs  8/20/2022 11:03 AM    I spent 60 minutes in the care of this patient. Over 50% of that time was in face-to-face counseling regarding disease process, diagnostic testing, preventative measures, and answering patient and family questions.

## 2022-08-20 NOTE — H&P
ICU HISTORY AND [facility] Day:   ICU Day:                                                          Code:Full Code  Admit Date: 8/20/2022  PCP: No primary care provider on file. CC: Loss of Consciousness (Pt via squad for \"unresponsive\". Squad reports pt was \"found on the couch by sister, barely breathing, SPO2 70% on RA upon arrival.  Pt unresponsive upon arrival)    HISTORY OF PRESENT ILLNESS:   Venancio Rankin is a 78 y.o. female transferred via air care to [facility] from [address] ED where she presented to the ED brought in by EMS, called for unresponsive patient since 4 AM in the morning. History, obtained from her sister Rebecca Rivas via phone. Sister reports that patient has been complaining of SOB over the last week or 2, she became more short of breath through the night and she sat down on the couch, and became slowly less responsive, breathing shallow and rapidly with profused sweating. EMS was called and she was brought to [address] ED. 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 She had been stressing a lot about the medical condition his son, lately. In [address] ED she was unresponsiveness, white foamy discharge coming from her mouth, she was hypoxic around 70% with respiratory distress, tachypneic in the 30s on arrival, she was promptly intubated and work-up initiated  -- Labs: CMP unremarkable other than blood glucose of 253. CBC WBC 13, hemoglobin 15.8, proBNP 23,393.   Trop 0.07,   -- EKG noted a STEMI  -- CT Head showed large intraparenchymal left-sided hemorrhage (aspirin, Brilinta, heparin and these were promptly canceled)  -- She was HTN: Nicardipine and Keppra given  -- She was given 1 L bolus fluids,  chest x-ray: concerns for pulmonary edema  -- Purulent drainage from the right eye, this was cleaned and antibiotic drops placed, I discussed all this with Dr. Frederick Carlos hospitalist at [facility], she agreed to accept for admission, I later called Dr. Amelia Cobb cardiology service at [facility] to update him on the patient who is being transferred there, her troponin did improve from 0.07 to 0.05, I made some vent adjustments and her blood gases notably improved. -- Dr. Fazal Olsen, he recommended transfer to [facility] ICU and Dr. Amelia Cobb cardiology have been updated  --she was transported via air care to [facility]. PAST HISTORY:   No past medical history on file. Past Surgical History:   Procedure Laterality Date    EYE SURGERY      HYSTERECTOMY (CERVIX STATUS UNKNOWN)      NECK SURGERY         SocialHistory:   The patient lives at    Alcohol:  Illicit drugs: no use  Tobacco:      Family History:  No family history on file. MEDICATIONS:     No current facility-administered medications on file prior to encounter. Current Outpatient Medications on File Prior to Encounter   Medication Sig Dispense Refill    gabapentin (NEURONTIN) 100 MG capsule Take 1 capsule by mouth 3 times daily for 30 days.  Intended supply: 30 days 90 capsule 0         Scheduled Meds:   sodium chloride flush  5-40 mL IntraVENous 2 times per day    insulin lispro  0-4 Units SubCUTAneous Q6H    metoprolol  2.5 mg IntraVENous Q4H    atorvastatin  40 mg Orogastric Nightly    dexamethasone  4 mg IntraVENous Q6H    levETIRAcetam  1,000 mg IntraVENous Q12H    ipratropium-albuterol  1 ampule Inhalation 4x daily      Continuous Infusions:   sodium chloride      niCARdipine 3 mg/hr (08/20/22 0830)    dextrose       PRN Meds:sodium chloride flush, sodium chloride, ondansetron **OR** ondansetron, polyethylene glycol, acetaminophen **OR** acetaminophen, glucose, dextrose bolus **OR** dextrose bolus, glucagon (rDNA), dextrose    Allergies: No Known Allergies    REVIEW OF SYSTEMS:       History obtained from unobtainable from patient due to mental status    Review of Systems   Constitutional:  Positive for diaphoresis. Negative for fever and unexpected weight change. HENT:  Positive for drooling. White foamy discharge    Eyes:  Positive for discharge and redness. Respiratory:  Positive for shortness of breath. Negative for apnea. Cardiovascular:  Negative for chest pain. Gastrointestinal:  Negative for abdominal distention, abdominal pain and anal bleeding. Endocrine: Negative. Genitourinary: Negative. Musculoskeletal: Negative. Skin: Negative. Neurological:         Patient is unconscious and intubated   Hematological: Negative. PHYSICAL EXAM:       Vitals: /79   Pulse 89   Resp 18   SpO2 94%     I/O:  No intake or output data in the 24 hours ending 08/20/22 1520  No intake/output data recorded. No intake/output data recorded. Physical Examination:     Physical Exam  Constitutional:       Comments: Patient is unconscious and intubated   HENT:      Head: Normocephalic and atraumatic. Eyes:      General:         Left eye: Discharge present. Cardiovascular:      Rate and Rhythm: Normal rate and regular rhythm. Pulses: Normal pulses. Heart sounds: Normal heart sounds. Pulmonary:      Comments: Intubated  Abdominal:      General: Abdomen is flat. Bowel sounds are normal.      Palpations: Abdomen is soft. Skin:     General: Skin is warm.          Access:   -Central Access Day #:  None                                   -Peripheral Access Day#:1  -Arterial line Day#:None                                Combs Day#:1  NGT Day#: 1                                            ETT Day#:1  Vent Settings: Vent Mode: AC/PRVC Resp Rate (Set): 18 bmp/Vt (Set, mL): 350 mL/ /FiO2 : 50 %    Recent Labs     08/20/22  0657 08/20/22  0930   PHART 7.385 7.307*   JPS4QNT 45.4* 60.1*   PO2ART 77.5 85.7           DATA:       Labs:  CBC:   Recent Labs     08/20/22  0447   WBC 13.0*   HGB 15.8   HCT 49.7*          BMP:   Recent Labs     08/20/22  0447      K 4.6      CO2 30   BUN 23*   CREATININE 0.8   GLUCOSE 258*     LFT's:   Recent Labs     08/20/22  0447   AST 27   ALT 41*   BILITOT 0.7   ALKPHOS 80     Troponin:   Recent Labs     08/20/22  0649 08/20/22  0930 08/20/22  1340   TROPONINI 0.05* 0.54* 1.08*     BNP:No results for input(s): BNP in the last 72 hours. ABGs:   Recent Labs     08/20/22  0657 08/20/22  0930   PHART 7.385 7.307*   [id number] 45.4* 60.1*   PO2ART 77.5 85.7     INR:   Recent Labs     08/20/22  0447 08/20/22  0930   INR 0.98 0.98       U/A:  Recent Labs     08/20/22  0640   COLORU Yellow   PHUR 6.5  6.5   WBCUA 3-5   RBCUA 5-10*   BACTERIA Rare*   CLARITYU Clear   SPECGRAV >=1.030   LEUKOCYTESUR Negative   UROBILINOGEN 0.2   BILIRUBINUR Negative   BLOODU SMALL*   GLUCOSEU 250*   AMORPHOUS Rare       XR ABDOMEN (KUB) (SINGLE AP VIEW)   Final Result      1. Nasogastric tube tip mid stomach. CTA HEAD W WO CONTRAST    (Results Pending)       EKG: STEMI  Echo: Not Done  Micro: Not Done    ASSESSMENT AND PLAN:   Annmarie Yang is a 78 y.o. female with unremarkable PMHx, presented with LOS, has been SOB for two weeks, CT showed IPH and STEMI on EKG. #Intraparenchymal left-sided hemorrhage (Altered Mental Status)  Patient presented with loss of consciousness to the ED, CT head showed intraparenchymal hemorrhage.   She was hypertensive to 351 systolic blood pressure on presentation  -Nicardipine drip  -Kepra 1 g BID  -Maintain blood pressure less than 160  -Vital monitoring  -Consult neurology  -Consult Neurosurgery: Left message for Dr Gaby Wood on perfect serve  -Neuro check Q 1 [place] for swelling  -Repeat CT wo contrast    #STEMI  Presented with loss of consciousness, and labored breathing. EKG in the ED showed STEMI. -Cardiology consult. Spoke with cardiology . They are ok with holfing AP/AC given the patient has intracranial bleed.  -Atorvastatin   -Metoprolol 2.5 Qh4  -Continuous telemetry  -Echo    #Acute Respiratory Failure with Hypoxia and Hypercapnia  Patient had SOB, productive cough, recent STEMI which all can cause Respiratory failure. -Duoneb  Intubated: SPO2 94%  Vent settings: RR 18, FiO2 50%, PEEP 8, TD volume 400    #Pulmonary Edema  Pulmonary edema on CXR likely secondary to STEMI  -Monitor ins and Outs    Code Status:Full Code  FEN: NPO  PPX:  SCD. Not anticoagulated bcz she has ICH  DISPO: From home to [facility], tranferred to [place] [id number] ICU will be discharged to home?     This patient has been staffed and discussed with Dr. Patrick Kahn  -----------------------------  Praveen Rouse MD, PGY-1  8/20/2022  3:20 PM

## 2022-08-20 NOTE — CONSULTS
Clinical Pharmacy Progress Note    All IVs in NS - Management by Pharmacy    Consult Date(s): 8/20  Consulting Provider(s): Dr Daniel Horowitz / Cindy Stern Left [facility] in setting of STEMI - All IVs in Normal Saline  Drips will be adjusted to normal saline as appropriate based on compatibility, in an effort to avoid fluid shifts, as D5W is osmotically active. The following intermittent IV drips / infusions have been adjusted to saline:  Levetiracetam   Nicardipine gtt  The following medications must remain in D5W due to incompatibility with normal saline:  None at this time  Note: Patient has dextrose ordered as part of hypoglycemia treatment protocol. Total IV fluid delivered to patient over last 24 hrs: TBD in 24 hours  Pharmacist will follow daily to ensure all IVPBs / drips are in NS where possible. Thank you for consulting Pharmacy!     Nayeli CasasD., BCPS   8/20/2022 9:21 AM  Wireless: [phone]

## 2022-08-20 NOTE — PROGRESS NOTES
Speech Language Pathology  Dc    Order received, chart reviewed, pt is currently inubated. Please re-refer s/p extubation as pt appropriate        ENRIQUE POLANCO M.S./CCC-SLP #[id number]  Pg.  # [phone]

## 2022-08-20 NOTE — PLAN OF CARE
Paged [facility] regarding troponin elevation. Unclear if this will alter management as putting her on anticoagulants/antiplatelets at this time is still very risky given the size of her ICH.   Left message with on-call physician with call back #    Tra Travis NP  Neurology

## 2022-08-20 NOTE — ED NOTES
Interventionalist paged for STEMI. EKG delayed due to profuse sweating and intubating PT.      Karthik Robles, EMT-P  08/20/22 [id number]

## 2022-08-21 ENCOUNTER — APPOINTMENT (OUTPATIENT)
Dept: GENERAL RADIOLOGY | Age: 67
DRG: 064 | End: 2022-08-21
Attending: INTERNAL MEDICINE

## 2022-08-21 ENCOUNTER — APPOINTMENT (OUTPATIENT)
Dept: CT IMAGING | Age: 67
DRG: 064 | End: 2022-08-21
Attending: INTERNAL MEDICINE

## 2022-08-21 PROBLEM — J96.01 ACUTE RESPIRATORY FAILURE WITH HYPOXIA (HCC): Status: ACTIVE | Noted: 2022-08-21

## 2022-08-21 PROBLEM — I61.0 NONTRAUMATIC SUBCORTICAL HEMORRHAGE OF LEFT CEREBRAL HEMISPHERE (HCC): Status: ACTIVE | Noted: 2022-08-21

## 2022-08-21 LAB
ALBUMIN SERPL-MCNC: 3.2 G/DL (ref 3.4–5)
ALBUMIN SERPL-MCNC: 3.4 G/DL (ref 3.4–5)
ALBUMIN SERPL-MCNC: 3.5 G/DL (ref 3.4–5)
ALP BLD-CCNC: 79 U/L (ref 40–129)
ALT SERPL-CCNC: 103 U/L (ref 10–40)
ANION GAP SERPL CALCULATED.3IONS-SCNC: 14 MMOL/L (ref 3–16)
ANION GAP SERPL CALCULATED.3IONS-SCNC: 15 MMOL/L (ref 3–16)
AST SERPL-CCNC: 142 U/L (ref 15–37)
BASE EXCESS ARTERIAL: -1.4 MMOL/L (ref -3–3)
BASE EXCESS ARTERIAL: 2.6 MMOL/L (ref -3–3)
BASOPHILS ABSOLUTE: 0 K/UL (ref 0–0.2)
BASOPHILS ABSOLUTE: 0 K/UL (ref 0–0.2)
BASOPHILS RELATIVE PERCENT: 0 %
BASOPHILS RELATIVE PERCENT: 0 %
BILIRUB SERPL-MCNC: 0.6 MG/DL (ref 0–1)
BILIRUBIN DIRECT: <0.2 MG/DL (ref 0–0.3)
BILIRUBIN, INDIRECT: ABNORMAL MG/DL (ref 0–1)
BUN BLDV-MCNC: 26 MG/DL (ref 7–20)
BUN BLDV-MCNC: 37 MG/DL (ref 7–20)
CALCIUM SERPL-MCNC: 8.6 MG/DL (ref 8.3–10.6)
CALCIUM SERPL-MCNC: 8.8 MG/DL (ref 8.3–10.6)
CARBOXYHEMOGLOBIN ARTERIAL: 0.4 % (ref 0–1.5)
CARBOXYHEMOGLOBIN ARTERIAL: 0.6 % (ref 0–1.5)
CHLORIDE BLD-SCNC: 104 MMOL/L (ref 99–110)
CHLORIDE BLD-SCNC: 108 MMOL/L (ref 99–110)
CO2: 22 MMOL/L (ref 21–32)
CO2: 24 MMOL/L (ref 21–32)
CREAT SERPL-MCNC: 1 MG/DL (ref 0.6–1.2)
CREAT SERPL-MCNC: 1.3 MG/DL (ref 0.6–1.2)
EOSINOPHILS ABSOLUTE: 0 K/UL (ref 0–0.6)
EOSINOPHILS ABSOLUTE: 0 K/UL (ref 0–0.6)
EOSINOPHILS RELATIVE PERCENT: 0 %
EOSINOPHILS RELATIVE PERCENT: 0.1 %
GFR AFRICAN AMERICAN: 50
GFR AFRICAN AMERICAN: >60
GFR NON-AFRICAN AMERICAN: 41
GFR NON-AFRICAN AMERICAN: 55
GLUCOSE BLD-MCNC: 135 MG/DL (ref 70–99)
GLUCOSE BLD-MCNC: 146 MG/DL (ref 70–99)
GLUCOSE BLD-MCNC: 150 MG/DL (ref 70–99)
GLUCOSE BLD-MCNC: 156 MG/DL (ref 70–99)
GLUCOSE BLD-MCNC: 184 MG/DL (ref 70–99)
GLUCOSE BLD-MCNC: 184 MG/DL (ref 70–99)
HCO3 ARTERIAL: 24 MMOL/L (ref 21–29)
HCO3 ARTERIAL: 28 MMOL/L (ref 21–29)
HCT VFR BLD CALC: 42.3 % (ref 36–48)
HCT VFR BLD CALC: 44.1 % (ref 36–48)
HEMOGLOBIN, ART, EXTENDED: 14.1 G/DL
HEMOGLOBIN, ART, EXTENDED: 15 G/DL
HEMOGLOBIN: 13.3 G/DL (ref 12–16)
HEMOGLOBIN: 13.5 G/DL (ref 12–16)
LACTIC ACID: 5.4 MMOL/L (ref 0.4–2)
LYMPHOCYTES ABSOLUTE: 0.6 K/UL (ref 1–5.1)
LYMPHOCYTES ABSOLUTE: 1 K/UL (ref 1–5.1)
LYMPHOCYTES RELATIVE PERCENT: 2.6 %
LYMPHOCYTES RELATIVE PERCENT: 6.9 %
MAGNESIUM: 3.3 MG/DL (ref 1.8–2.4)
MCH RBC QN AUTO: 24.2 PG (ref 26–34)
MCH RBC QN AUTO: 24.3 PG (ref 26–34)
MCHC RBC AUTO-ENTMCNC: 30.7 G/DL (ref 31–36)
MCHC RBC AUTO-ENTMCNC: 31.5 G/DL (ref 31–36)
MCV RBC AUTO: 77 FL (ref 80–100)
MCV RBC AUTO: 78.8 FL (ref 80–100)
METHEMOGLOBIN ARTERIAL: 0 % (ref 0–1.4)
METHEMOGLOBIN ARTERIAL: 0 % (ref 0–1.4)
MONOCYTES ABSOLUTE: 0.3 K/UL (ref 0–1.3)
MONOCYTES ABSOLUTE: 0.9 K/UL (ref 0–1.3)
MONOCYTES RELATIVE PERCENT: 2.3 %
MONOCYTES RELATIVE PERCENT: 3.8 %
NEUTROPHILS ABSOLUTE: 12.6 K/UL (ref 1.7–7.7)
NEUTROPHILS ABSOLUTE: 22 K/UL (ref 1.7–7.7)
NEUTROPHILS RELATIVE PERCENT: 90.7 %
NEUTROPHILS RELATIVE PERCENT: 93.6 %
O2 SAT, ARTERIAL: 95 % (ref 93–100)
O2 SAT, ARTERIAL: 98 % (ref 93–100)
PCO2 ARTERIAL: 42 MMHG (ref 35–45)
PCO2 ARTERIAL: 44.2 MMHG (ref 35–45)
PDW BLD-RTO: 16.1 % (ref 12.4–15.4)
PDW BLD-RTO: 16.4 % (ref 12.4–15.4)
PERFORMED ON: ABNORMAL
PH ARTERIAL: 7.35 (ref 7.35–7.45)
PH ARTERIAL: 7.42 (ref 7.35–7.45)
PHOSPHORUS: 3.8 MG/DL (ref 2.5–4.9)
PHOSPHORUS: 4.8 MG/DL (ref 2.5–4.9)
PLATELET # BLD: 192 K/UL (ref 135–450)
PLATELET # BLD: 226 K/UL (ref 135–450)
PMV BLD AUTO: 8.1 FL (ref 5–10.5)
PMV BLD AUTO: 8.1 FL (ref 5–10.5)
PO2 ARTERIAL: 114 MMHG (ref 75–108)
PO2 ARTERIAL: 73.9 MMHG (ref 75–108)
POTASSIUM SERPL-SCNC: 4.2 MMOL/L (ref 3.5–5.1)
POTASSIUM SERPL-SCNC: 4.6 MMOL/L (ref 3.5–5.1)
RBC # BLD: 5.49 M/UL (ref 4–5.2)
RBC # BLD: 5.6 M/UL (ref 4–5.2)
SODIUM BLD-SCNC: 142 MMOL/L (ref 136–145)
SODIUM BLD-SCNC: 143 MMOL/L (ref 136–145)
SODIUM BLD-SCNC: 144 MMOL/L (ref 136–145)
SODIUM BLD-SCNC: 145 MMOL/L (ref 136–145)
SODIUM BLD-SCNC: 146 MMOL/L (ref 136–145)
SODIUM BLD-SCNC: 146 MMOL/L (ref 136–145)
TCO2 ARTERIAL: 26 MMOL/L
TCO2 ARTERIAL: 29 MMOL/L
TOTAL PROTEIN: 6.2 G/DL (ref 6.4–8.2)
WBC # BLD: 13.9 K/UL (ref 4–11)
WBC # BLD: 23.5 K/UL (ref 4–11)

## 2022-08-21 PROCEDURE — 80076 HEPATIC FUNCTION PANEL: CPT

## 2022-08-21 PROCEDURE — 93005 ELECTROCARDIOGRAM TRACING: CPT

## 2022-08-21 PROCEDURE — 71045 X-RAY EXAM CHEST 1 VIEW: CPT

## 2022-08-21 PROCEDURE — 6360000002 HC RX W HCPCS: Performed by: STUDENT IN AN ORGANIZED HEALTH CARE EDUCATION/TRAINING PROGRAM

## 2022-08-21 PROCEDURE — 2580000003 HC RX 258: Performed by: STUDENT IN AN ORGANIZED HEALTH CARE EDUCATION/TRAINING PROGRAM

## 2022-08-21 PROCEDURE — 2000000000 HC ICU R&B

## 2022-08-21 PROCEDURE — 2500000003 HC RX 250 WO HCPCS

## 2022-08-21 PROCEDURE — 85025 COMPLETE CBC W/AUTO DIFF WBC: CPT

## 2022-08-21 PROCEDURE — 94761 N-INVAS EAR/PLS OXIMETRY MLT: CPT

## 2022-08-21 PROCEDURE — 83605 ASSAY OF LACTIC ACID: CPT

## 2022-08-21 PROCEDURE — 83735 ASSAY OF MAGNESIUM: CPT

## 2022-08-21 PROCEDURE — 83036 HEMOGLOBIN GLYCOSYLATED A1C: CPT

## 2022-08-21 PROCEDURE — 99223 1ST HOSP IP/OBS HIGH 75: CPT | Performed by: INTERNAL MEDICINE

## 2022-08-21 PROCEDURE — 94640 AIRWAY INHALATION TREATMENT: CPT

## 2022-08-21 PROCEDURE — 2500000003 HC RX 250 WO HCPCS: Performed by: STUDENT IN AN ORGANIZED HEALTH CARE EDUCATION/TRAINING PROGRAM

## 2022-08-21 PROCEDURE — 6370000000 HC RX 637 (ALT 250 FOR IP): Performed by: STUDENT IN AN ORGANIZED HEALTH CARE EDUCATION/TRAINING PROGRAM

## 2022-08-21 PROCEDURE — 36556 INSERT NON-TUNNEL CV CATH: CPT

## 2022-08-21 PROCEDURE — 82803 BLOOD GASES ANY COMBINATION: CPT

## 2022-08-21 PROCEDURE — 84295 ASSAY OF SERUM SODIUM: CPT

## 2022-08-21 PROCEDURE — 36415 COLL VENOUS BLD VENIPUNCTURE: CPT

## 2022-08-21 PROCEDURE — 2580000003 HC RX 258: Performed by: NURSE PRACTITIONER

## 2022-08-21 PROCEDURE — 70450 CT HEAD/BRAIN W/O DYE: CPT

## 2022-08-21 PROCEDURE — 80069 RENAL FUNCTION PANEL: CPT

## 2022-08-21 PROCEDURE — 2700000000 HC OXYGEN THERAPY PER DAY

## 2022-08-21 PROCEDURE — 99233 SBSQ HOSP IP/OBS HIGH 50: CPT | Performed by: INTERNAL MEDICINE

## 2022-08-21 PROCEDURE — 6360000002 HC RX W HCPCS

## 2022-08-21 PROCEDURE — 94003 VENT MGMT INPAT SUBQ DAY: CPT

## 2022-08-21 PROCEDURE — 99291 CRITICAL CARE FIRST HOUR: CPT | Performed by: NURSE PRACTITIONER

## 2022-08-21 RX ORDER — SODIUM CHLORIDE 0.9 % (FLUSH) 0.9 %
5-40 SYRINGE (ML) INJECTION PRN
Status: DISCONTINUED | OUTPATIENT
Start: 2022-08-21 | End: 2022-08-23 | Stop reason: HOSPADM

## 2022-08-21 RX ORDER — LIDOCAINE HYDROCHLORIDE 10 MG/ML
5 INJECTION, SOLUTION EPIDURAL; INFILTRATION; INTRACAUDAL; PERINEURAL ONCE
Status: DISCONTINUED | OUTPATIENT
Start: 2022-08-21 | End: 2022-08-22

## 2022-08-21 RX ORDER — SODIUM CHLORIDE 9 MG/ML
25 INJECTION, SOLUTION INTRAVENOUS PRN
Status: DISCONTINUED | OUTPATIENT
Start: 2022-08-21 | End: 2022-08-23 | Stop reason: HOSPADM

## 2022-08-21 RX ORDER — LABETALOL HYDROCHLORIDE 5 MG/ML
10 INJECTION, SOLUTION INTRAVENOUS ONCE
Status: COMPLETED | OUTPATIENT
Start: 2022-08-21 | End: 2022-08-21

## 2022-08-21 RX ORDER — MORPHINE SULFATE 2 MG/ML
2 INJECTION, SOLUTION INTRAMUSCULAR; INTRAVENOUS EVERY 4 HOURS PRN
Status: DISCONTINUED | OUTPATIENT
Start: 2022-08-21 | End: 2022-08-23 | Stop reason: HOSPADM

## 2022-08-21 RX ORDER — SODIUM CHLORIDE, SODIUM LACTATE, POTASSIUM CHLORIDE, AND CALCIUM CHLORIDE .6; .31; .03; .02 G/100ML; G/100ML; G/100ML; G/100ML
1000 INJECTION, SOLUTION INTRAVENOUS ONCE
Status: COMPLETED | OUTPATIENT
Start: 2022-08-21 | End: 2022-08-21

## 2022-08-21 RX ORDER — 3% SODIUM CHLORIDE 3 G/100ML
25 INJECTION, SOLUTION INTRAVENOUS CONTINUOUS
Status: DISCONTINUED | OUTPATIENT
Start: 2022-08-21 | End: 2022-08-22

## 2022-08-21 RX ORDER — SODIUM CHLORIDE 0.9 % (FLUSH) 0.9 %
5-40 SYRINGE (ML) INJECTION EVERY 12 HOURS SCHEDULED
Status: DISCONTINUED | OUTPATIENT
Start: 2022-08-21 | End: 2022-08-23 | Stop reason: HOSPADM

## 2022-08-21 RX ADMIN — DEXAMETHASONE SODIUM PHOSPHATE 4 MG: 4 INJECTION, SOLUTION INTRAMUSCULAR; INTRAVENOUS at 23:39

## 2022-08-21 RX ADMIN — IPRATROPIUM BROMIDE AND ALBUTEROL SULFATE 1 AMPULE: 2.5; .5 SOLUTION RESPIRATORY (INHALATION) at 07:56

## 2022-08-21 RX ADMIN — SODIUM CHLORIDE 25 ML/HR: 3 INJECTION, SOLUTION INTRAVENOUS at 10:59

## 2022-08-21 RX ADMIN — SODIUM CHLORIDE: 9 INJECTION, SOLUTION INTRAVENOUS at 20:25

## 2022-08-21 RX ADMIN — IPRATROPIUM BROMIDE AND ALBUTEROL SULFATE 1 AMPULE: 2.5; .5 SOLUTION RESPIRATORY (INHALATION) at 17:07

## 2022-08-21 RX ADMIN — METOPROLOL TARTRATE 2.5 MG: 5 INJECTION, SOLUTION INTRAVENOUS at 01:28

## 2022-08-21 RX ADMIN — MANNITOL 25 G: 20 INJECTION, SOLUTION INTRAVENOUS at 00:47

## 2022-08-21 RX ADMIN — SODIUM CHLORIDE, PRESERVATIVE FREE 10 ML: 5 INJECTION INTRAVENOUS at 20:26

## 2022-08-21 RX ADMIN — MORPHINE SULFATE 2 MG: 2 INJECTION, SOLUTION INTRAMUSCULAR; INTRAVENOUS at 17:42

## 2022-08-21 RX ADMIN — SODIUM CHLORIDE 0.2 MCG/KG/HR: 9 INJECTION, SOLUTION INTRAVENOUS at 08:10

## 2022-08-21 RX ADMIN — METOPROLOL TARTRATE 2.5 MG: 5 INJECTION, SOLUTION INTRAVENOUS at 21:20

## 2022-08-21 RX ADMIN — DEXAMETHASONE SODIUM PHOSPHATE 4 MG: 4 INJECTION, SOLUTION INTRAMUSCULAR; INTRAVENOUS at 05:30

## 2022-08-21 RX ADMIN — SODIUM CHLORIDE, POTASSIUM CHLORIDE, SODIUM LACTATE AND CALCIUM CHLORIDE 1000 ML: 600; 310; 30; 20 INJECTION, SOLUTION INTRAVENOUS at 18:13

## 2022-08-21 RX ADMIN — SODIUM CHLORIDE 1000 MG: 9 INJECTION, SOLUTION INTRAVENOUS at 08:13

## 2022-08-21 RX ADMIN — DEXAMETHASONE SODIUM PHOSPHATE 4 MG: 4 INJECTION, SOLUTION INTRAMUSCULAR; INTRAVENOUS at 17:42

## 2022-08-21 RX ADMIN — SODIUM CHLORIDE 1000 MG: 9 INJECTION, SOLUTION INTRAVENOUS at 20:26

## 2022-08-21 RX ADMIN — IPRATROPIUM BROMIDE AND ALBUTEROL SULFATE 1 AMPULE: 2.5; .5 SOLUTION RESPIRATORY (INHALATION) at 19:40

## 2022-08-21 RX ADMIN — DEXAMETHASONE SODIUM PHOSPHATE 4 MG: 4 INJECTION, SOLUTION INTRAMUSCULAR; INTRAVENOUS at 12:25

## 2022-08-21 RX ADMIN — DEXAMETHASONE SODIUM PHOSPHATE 4 MG: 4 INJECTION, SOLUTION INTRAMUSCULAR; INTRAVENOUS at 00:47

## 2022-08-21 RX ADMIN — METOPROLOL TARTRATE 2.5 MG: 5 INJECTION, SOLUTION INTRAVENOUS at 08:32

## 2022-08-21 RX ADMIN — LABETALOL HYDROCHLORIDE 10 MG: 5 INJECTION, SOLUTION INTRAVENOUS at 23:17

## 2022-08-21 RX ADMIN — IPRATROPIUM BROMIDE AND ALBUTEROL SULFATE 1 AMPULE: 2.5; .5 SOLUTION RESPIRATORY (INHALATION) at 11:45

## 2022-08-21 RX ADMIN — METOPROLOL TARTRATE 2.5 MG: 5 INJECTION, SOLUTION INTRAVENOUS at 05:30

## 2022-08-21 RX ADMIN — ATORVASTATIN CALCIUM 40 MG: 40 TABLET, FILM COATED ORAL at 21:20

## 2022-08-21 ASSESSMENT — PULMONARY FUNCTION TESTS
PIF_VALUE: 28
PIF_VALUE: 25
PIF_VALUE: 23
PIF_VALUE: 23
PIF_VALUE: 22
PIF_VALUE: 16
PIF_VALUE: 23
PIF_VALUE: 22
PIF_VALUE: 24
PIF_VALUE: 25
PIF_VALUE: 24
PIF_VALUE: 24
PIF_VALUE: 25
PIF_VALUE: 24
PIF_VALUE: 25
PIF_VALUE: 24
PIF_VALUE: 25
PIF_VALUE: 25
PIF_VALUE: 27
PIF_VALUE: 26
PIF_VALUE: 27
PIF_VALUE: 26
PIF_VALUE: 25
PIF_VALUE: 24
PIF_VALUE: 25
PIF_VALUE: 26
PIF_VALUE: 24
PIF_VALUE: 22
PIF_VALUE: 25
PIF_VALUE: 28
PIF_VALUE: 26
PIF_VALUE: 25
PIF_VALUE: 25
PIF_VALUE: 23
PIF_VALUE: 25
PIF_VALUE: 25
PIF_VALUE: 23
PIF_VALUE: 26
PIF_VALUE: 29
PIF_VALUE: 25
PIF_VALUE: 25
PIF_VALUE: 26
PIF_VALUE: 19
PIF_VALUE: 25
PIF_VALUE: 26
PIF_VALUE: 25
PIF_VALUE: 25
PIF_VALUE: 33
PIF_VALUE: 24
PIF_VALUE: 25
PIF_VALUE: 21
PIF_VALUE: 27
PIF_VALUE: 14
PIF_VALUE: 28
PIF_VALUE: 25
PIF_VALUE: 19
PIF_VALUE: 25
PIF_VALUE: 24
PIF_VALUE: 22
PIF_VALUE: 23
PIF_VALUE: 25
PIF_VALUE: 24
PIF_VALUE: 27
PIF_VALUE: 27
PIF_VALUE: 26
PIF_VALUE: 42
PIF_VALUE: 25
PIF_VALUE: 22
PIF_VALUE: 25
PIF_VALUE: 24
PIF_VALUE: 25
PIF_VALUE: 23
PIF_VALUE: 24
PIF_VALUE: 25
PIF_VALUE: 15

## 2022-08-21 NOTE — PROGRESS NOTES
Spoke to patient's son Lorne Sheffield following code blue. Discussed patient's current condition and poor prognosis. With respect to code status, Lorne Sheffield indicated he would like to continue full code for now, and will speak to his other family members regarding this. He plans on returning to visit tomorrow and is agreeable to continued conversation with palliative.     Katia Brain PGY-2

## 2022-08-21 NOTE — PROGRESS NOTES
[facility], [place] Daily Progress Note      Admit Date:  8/20/2022    Subjective:  Ms. BRAMBILA was seen and examined. F/U ICH/STEMI. Intubated.  Remains essentially unresponsive    Objective:   BP (!) 142/71   Pulse (!) 105   Temp 99.5 °F (37.5 °C) (Bladder)   Resp 18   Wt 150 lb 2.1 oz (68.1 kg)   SpO2 93%   BMI 29.57 kg/m²     Intake/Output Summary (Last 24 hours) at 8/21/2022 0724  Last data filed at 8/21/2022 4898  Gross per 24 hour   Intake 804.61 ml   Output 1095 ml   Net -290.39 ml       TELEMETRY: Sinus     Physical Exam:  General:  Intubated  Skin:  Warm and dry  Neck:  JVP difficult  Chest:  BS equall  Cardiovascular:  RRR S1S2  Abdomen:  Soft quiet  Extremities:  tr edema    Medications:    sodium chloride flush  5-40 mL IntraVENous 2 times per day    insulin lispro  0-4 Units SubCUTAneous Q6H    metoprolol  2.5 mg IntraVENous Q4H    atorvastatin  40 mg Orogastric Nightly    dexamethasone  4 mg IntraVENous Q6H    levETIRAcetam  1,000 mg IntraVENous Q12H    ipratropium-albuterol  1 ampule Inhalation 4x daily      sodium chloride      niCARdipine Stopped (08/20/22 1936)    dextrose       sodium chloride flush, sodium chloride, ondansetron **OR** ondansetron, polyethylene glycol, acetaminophen **OR** acetaminophen, glucose, dextrose bolus **OR** dextrose bolus, glucagon (rDNA), dextrose, perflutren lipid microspheres    Lab Data:  CBC:   Recent Labs     08/20/22 0447 08/21/22  0525   WBC 13.0* 23.5*   HGB 15.8 13.3   HCT 49.7* 42.3   MCV 78.3* 77.0*    226     BMP:   Recent Labs     08/20/22 0447 08/21/22  0524    142   K 4.6 4.2    104   CO2 30 24   PHOS  --  3.8   BUN 23* 26*   CREATININE 0.8 1.0     LIVER PROFILE:   Recent Labs     08/20/22 0447   AST 27   ALT 41*   BILITOT 0.7   ALKPHOS 80     PT/INR:   Recent Labs     08/20/22 0447 08/20/22  0930   PROTIME 12.9 12.8   INR 0.98 0.98     APTT:   Recent Labs     08/20/22  0447   APTT 24.9     BNP:  No results for input(s): BNP in the last 72 hours. IMAGING:     Assessment:  Patient Active Problem List    Diagnosis Date Noted    STEMI (ST elevation myocardial infarction) ([facility] [address] 08/20/2022    Acute cerebrovascular accident (CVA) of basal ganglia ([facility] [address] 08/20/2022       Plan:  Remains essentially unresponsive. Moving left arm at times. CTA noted. Neurosurgery following. IV B blocker/statin. No antiplatelets due to [address]        Core Measures:  Discharge instructions:   LVEF documented:   ACEI for LV dysfunction:   Smoking Cessation:    Pietro Sánchez MD, MD 8/21/2022 7:24 AM

## 2022-08-21 NOTE — PROGRESS NOTES
Internal medicine progress note       Code:Full Code  Admit Date: 8/20/2022    PCP: No primary care provider on file. Reason for admission: Loss of Consciousness (Pt via squad for \"unresponsive\". Squad reports pt was \"found on the couch by sister, barely breathing, SPO2 70% on RA upon arrival.  Pt unresponsive upon arrival)    Initial HISTORY OF PRESENT ILLNESS:   Annmarie Yang is a 78 y.o. female transferred via air care to [facility] from \[facility]\"" ED where she presented to the ED brought in by EMS, called for unresponsive patient since 4 AM in the morning. History, obtained from her sister Junito Herrera via phone. Sister reports that patient has been complaining of SOB over the last week or 2, she became more short of breath through the night and she sat down on the couch, and became slowly less responsive, breathing shallow and rapidly with profused sweating. EMS was called and she was brought to \[facility]\"" ED. 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 She had been stressing a lot about the medical condition his son, lately. In \[facility]\"" ED she was unresponsiveness, white foamy discharge coming from her mouth, she was hypoxic around 70% with respiratory distress, tachypneic in the 30s on arrival, she was promptly intubated and work-up initiated  -- Labs: CMP unremarkable other than blood glucose of 253. CBC WBC 13, hemoglobin 15.8, proBNP 23,393.   Trop 0.07,   -- EKG noted a STEMI  -- CT Head showed large intraparenchymal left-sided hemorrhage (aspirin, Brilinta, heparin and these were promptly canceled)  -- She was HTN: Nicardipine and Keppra given  -- She was given 1 L bolus fluids,  chest x-ray: concerns for pulmonary edema  -- Purulent drainage from the right eye, this was cleaned and antibiotic drops placed, I discussed all this with Dr. Rosette Ulloa hospitalist at [facility], she agreed to accept for admission, I later called Dr. Olga Ureña cardiology service at [facility] to update him on the patient who is being transferred there, her troponin did improve from 0.07 to 0.05, I made some vent adjustments and her blood gases notably improved. -- Dr. Kathlene Osler, he recommended transfer to [facility] ICU and Dr. Olga Ureña cardiology have been updated  --she was transported via air care to [facility]. Interval history. Patient was seen and examined at bedside. Son was at bedside. Patient remained intubated and on mechanical ventilation. Currently she is sedated on Precedex as she was fighting the ET tube. She received mannitol on August 20, 2022. Some movements of her lower extremities spontaneously. MEDICATIONS:     No current facility-administered medications on file prior to encounter. Current Outpatient Medications on File Prior to Encounter   Medication Sig Dispense Refill    gabapentin (NEURONTIN) 100 MG capsule Take 1 capsule by mouth 3 times daily for 30 days.  Intended supply: 30 days 90 capsule 0         Scheduled Meds:   lidocaine 1 % injection  5 mL IntraDERmal Once    sodium chloride flush  5-40 mL IntraVENous 2 times per day    sodium chloride flush  5-40 mL IntraVENous 2 times per day    insulin lispro  0-4 Units SubCUTAneous Q6H    metoprolol  2.5 mg IntraVENous Q4H    atorvastatin  40 mg Orogastric Nightly    dexamethasone  4 mg IntraVENous Q6H    levETIRAcetam  1,000 mg IntraVENous Q12H    ipratropium-albuterol  1 ampule Inhalation 4x daily      Continuous Infusions:   dexmedetomidine (PRECEDEX) IV infusion 0.8 mcg/kg/hr (08/21/22 1001)    sodium chloride 30 mL/hr 08/20/22  0930 08/21/22  0930   PHART 7.307* 7.424   GQS4QKZ 60.1* 42.0   PO2ART 85.7 73.9*             DATA:       Labs:  CBC:   Recent Labs     08/20/22  0447 08/21/22  0525   WBC 13.0* 23.5*   HGB 15.8 13.3   HCT 49.7* 42.3    226         BMP:   Recent Labs     08/20/22  0447 08/21/22  0524 08/21/22  1253 08/21/22  1458    142 144 143   K 4.6 4.2  --   --     104  --   --    CO2 30 24  --   --    BUN 23* 26*  --   --    CREATININE 0.8 1.0  --   --    GLUCOSE 258* 146*  --   --    PHOS  --  3.8  --   --        LFT's:   Recent Labs     08/20/22  0447   AST 27   ALT 41*   BILITOT 0.7   ALKPHOS 80       Troponin:   Recent Labs     08/20/22  0649 08/20/22  0930 08/20/22  1340   TROPONINI 0.05* 0.54* 1.08*       BNP:No results for input(s): BNP in the last 72 hours. ABGs:   Recent Labs     08/20/22  0930 08/21/22  0930   PHART 7.307* 7.424   IMV3PCA 60.1* 42.0   PO2ART 85.7 73.9*       INR:   Recent Labs     08/20/22  0447 08/20/22  0930   INR 0.98 0.98         U/A:  Recent Labs     08/20/22  0640   COLORU Yellow   PHUR 6.5  6.5   WBCUA 3-5   RBCUA 5-10*   BACTERIA Rare*   CLARITYU Clear   SPECGRAV >=1.030   LEUKOCYTESUR Negative   UROBILINOGEN 0.2   BILIRUBINUR Negative   BLOODU SMALL*   GLUCOSEU 250*   AMORPHOUS Rare         CTA HEAD W WO CONTRAST   Final Result      1. Evolving large parenchymal hematoma centered in the left basal ganglia with interval slight increase in size. Slight worsening of adjacent vasogenic edema and mass effect with subfalcine herniation and 6 mm midline shift to right. 2. No evidence of intracranial aneurysm, AVM or large vessel occlusion. XR ABDOMEN (KUB) (SINGLE AP VIEW)   Final Result      1. Nasogastric tube tip mid stomach.             CT HEAD WO CONTRAST    (Results Pending)       EKG: STEMI  Echo: Not Done  Micro: Not Done    ASSESSMENT AND PLAN:   Celeste Bran is a 78 y.o. female with unremarkable PMHx, presented with LOS, has been SOB for two weeks, CT showed IPH and STEMI on EKG. #Intraparenchymal left-sided hemorrhage (Altered Mental Status)  Patient presented with loss of consciousness to the ED, CT head showed intraparenchymal hemorrhage. She was hypertensive to 272 systolic blood pressure on presentation  -Nicardipine drip  -Kepra 1 g BID  -Maintain blood pressure less than 160  -Vital monitoring  -neurology and Neurosurgery services following.  -Neuro check Q 1 Hr  -Decadron for swelling  -Repeat CT wo contrast on August 20, 2022 showed worsening bleeding with vasogenic edema. Patient received 25 g of mannitol twice on 8/20th.  -as of August 21, 2022, some neurological recovery noted. #STEMI  Presented with loss of consciousness, and labored breathing. EKG in the ED showed anterolateral wall STEMI. -Cardiology consult and following.  -Continue atorvastatin, Metoprolol 2.5 Qh4  -Unable to use anticoagulation or antiplatelet due to intracranial hemorrhage.  -Continuous telemetry  -Echo    #Acute Respiratory Failure with Hypoxia and Hypercapnia  Patient had SOB, productive cough, recent STEMI which all can cause Respiratory failure. -Duoneb  Intubated: Intensivist managing. #Pulmonary Edema  Pulmonary edema on CXR likely secondary to STEMI  -Monitor I's and O's    Code Status:Full Code  FEN: NPO  PPX:  SCD. Not anticoagulated due to the fact that she has ICH  DISPO: Remains critically ill, in the intensive care unit. High risk for cardiac and neurological compromise due to acute intracranial bleed as well as acute ST elevation MI.     Yasmin Lopez MD  8/21/2022  3:32 PM

## 2022-08-21 NOTE — PLAN OF CARE
Problem: Discharge Planning  Goal: Discharge to home or other facility with appropriate resources  Outcome: Progressing     Problem: Chronic Conditions and Co-morbidities  Goal: Patient's chronic conditions and co-morbidity symptoms are monitored and maintained or improved  Outcome: Progressing     Problem: Safety - Medical Restraint  Goal: Remains free of injury from restraints (Restraint for Interference with Medical Device)  Description: INTERVENTIONS:  1. Determine that other, less restrictive measures have been tried or would not be effective before applying the restraint  2. Evaluate the patient's condition at the time of restraint application  3. Inform patient/family regarding the reason for restraint  4. Q2H: Monitor safety, psychosocial status, comfort, nutrition and hydration  Outcome: Progressing  Flowsheets (Taken 8/20/2022 1800)  Remains free of injury from restraints (restraint for interference with medical device):   Determine that other, less restrictive measures have been tried or would not be effective before applying the restraint   Evaluate the patient's condition at the time of restraint application   Every 2 hours: Monitor safety, psychosocial status, comfort, nutrition and hydration   Inform patient/family regarding the reason for restraint     Problem: Pain  Goal: Verbalizes/displays adequate comfort level or baseline comfort level  Outcome: Progressing     Problem: Skin/Tissue Integrity  Goal: Absence of new skin breakdown  Description: 1. Monitor for areas of redness and/or skin breakdown  2. Assess vascular access sites hourly  3. Every 4-6 hours minimum:  Change oxygen saturation probe site  4. Every 4-6 hours:  If on nasal continuous positive airway pressure, respiratory therapy assess nares and determine need for appliance change or resting period.   Outcome: Progressing     Problem: Safety - Adult  Goal: Free from fall injury  Outcome: Progressing

## 2022-08-21 NOTE — CONSULTS
ICU Progress Note    Admit Date: 8/20/2022  Day: 2  Vent Day: None  IV Access:Peripheral  IV Fluids:None  Vasopressors:None                Antibiotics: None  Diet: Diet NPO    CC: Loss of Consciousness (Pt via squad for \"unresponsive\". Squad reports pt was \"found on the couch by sister, barely breathing, SPO2 70% on RA upon arrival.  Pt unresponsive upon arrival)    Interval history: Patient seen on the bedside  - No acute event overnight  - Neuroexam imprved to the following: GCS improved form 7-9   -Moving NAVNEET and LL ext, Motor power 3/5   -Right L and U ext: Motor power 0/5  - Vitals: Had been normotensive with Nicardipine off.  - Drips: Dexmedetomidine, Nicardipine  - Repeat CT: Showed edema, Neurosurg recs: Two manitol doses of 25 g. HPI: Miryam Love is a 78 y.o. female transferred via air care to [facility]. from [facility] ED where she presented to the ED, brought in by EMS, called for unresponsive patient since 4 AM in the morning. History, obtained from her sister Simba Guerrero via phone. Sister reports that patient has been complaining of SOB over the last week or 2, she became more short of breath through the night and she sat down on the couch, and became slowly less responsive, breathing shallow and rapidly with profused sweating. EMS was called and she was brought to [facility] ED. 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 She had been stressing a lot about the medical condition his son, lately. In [facility] ED she was unresponsiveness, white foamy discharge coming from her mouth, she was hypoxic around 70% with respiratory distress, tachypneic in the 30s on arrival, she was promptly intubated and work-up initiated  -- Labs: CMP unremarkable other than blood glucose of 253. CBC WBC 13, hemoglobin 15.8, proBNP 23,393. Trop 0.07,   -- EKG noted a STEMI  -- CT Head showed large intraparenchymal left-sided hemorrhage (aspirin, Brilinta, heparin and these were promptly canceled)  -- She was HTN: Nicardipine and Keppra given  -- She was given 1 L bolus fluids,  chest x-ray: concerns for pulmonary edema  -- Purulent drainage from the right eye, this was cleaned and antibiotic drops placed, I discussed all this with Dr. Sanchez Ridley hospitalist at [facility], she agreed to accept for admission, I later called Dr. Richi Ho cardiology service at [facility] to update him on the patient who is being transferred there, her troponin did improve from 0.07 to 0.05, I made some vent adjustments and her blood gases notably improved. -- Dr. Marianne Burger, he recommended transfer to [facility] ICU and Dr. Richi Ho cardiology have been updated  --she was transported via air care to [facility].     Medications:     Scheduled Meds:   sodium chloride flush  5-40 mL IntraVENous 2 times per day    insulin lispro  0-4 Units SubCUTAneous Q6H    metoprolol  2.5 mg IntraVENous Q4H    atorvastatin  40 mg Orogastric Nightly    dexamethasone  4 mg IntraVENous Q6H    levETIRAcetam  1,000 mg IntraVENous Q12H    ipratropium-albuterol  1 ampule Inhalation 4x daily     Continuous Infusions:   dexmedetomidine (PRECEDEX) IV infusion 0.4 mcg/kg/hr (08/21/22 0850)    sodium chloride      niCARdipine Stopped (08/20/22 1936)    dextrose       PRN Meds:sodium chloride flush, sodium chloride, ondansetron **OR** ondansetron, polyethylene glycol, acetaminophen **OR** acetaminophen, glucose, dextrose bolus **OR** dextrose bolus, glucagon (rDNA), dextrose, perflutren lipid microspheres    Objective:   Vitals:   T-max:  Patient Vitals for the past 8 hrs:   BP Temp Temp src Pulse Resp SpO2 Weight   08/21/22 0829 -- -- -- (!) 122 -- 91 % --   08/21/22 0800 -- 99.1 °F (37.3 °C) -- -- -- -- --   08/21/22 0747 -- -- -- (!) 110 -- 93 % --   08/21/22 0700 (!) 144/68 -- -- 92 -- 92 % --   08/21/22 0600 (!) 142/71 -- -- (!) 105 -- 93 % --   08/21/22 0500 (!) 143/82 -- -- 88 -- 94 % --   08/21/22 0400 134/76 99.5 °F (37.5 °C) Bladder 83 18 94 % --   08/21/22 0330 133/70 -- -- 79 -- 92 % --   08/21/22 0314 -- -- -- -- -- -- 150 lb 2.1 oz (68.1 kg)   08/21/22 0300 (!) 148/86 -- -- 83 -- 93 % --   08/21/22 0230 (!) 140/78 -- -- 80 -- 93 % --   08/21/22 0200 134/76 -- -- 80 -- 93 % --       Intake/Output Summary (Last 24 hours) at 8/21/2022 0931  Last data filed at 8/21/2022 9109  Gross per 24 hour   Intake 804.61 ml   Output 1095 ml   Net -290.39 ml       Physical Exam  Constitutional:       Comments: Intubated. GCS 9   HENT:      Head: Normocephalic and atraumatic. Cardiovascular:      Rate and Rhythm: Normal rate and regular rhythm. Pulses: Normal pulses. Heart sounds: Normal heart sounds. Pulmonary:      Effort: Pulmonary effort is normal.      Breath sounds: Normal breath sounds. Abdominal:      General: Abdomen is flat. Palpations: Abdomen is soft. Musculoskeletal:         General: Normal range of motion.          LABS:    CBC:   Recent Labs     08/20/22  0447 08/21/22  0525   WBC 13.0* 23.5*   HGB 15.8 13.3   HCT 49.7* 42.3    226   MCV 78.3* 77.0*     Renal:    Recent Labs     08/20/22  0447 08/20/22  1340 08/21/22  0524     --  142   K 4.6  --  4.2     --  104   CO2 30  --  24   BUN 23*  --  26*   CREATININE 0.8  --  1.0   GLUCOSE 258*  --  146*   CALCIUM 9.2  --  8.6   MG  --  1.40*  --    PHOS  --   --  3.8   ANIONGAP 9  --  14     Hepatic:   Recent Labs     08/20/22  0447 08/21/22  0524   AST 27  --    ALT 41*  --    BILITOT 0.7  --    PROT 7.8  --    LABALBU 4.3 3.5   ALKPHOS 80  --      Troponin:   Recent Labs     08/20/22  0649 08/20/22  0974 08/20/22  1340   TROPONINI 0.05* 0.54* 1.08*     BNP: No results for input(s): BNP in the last 72 hours. Lipids: No results for input(s): CHOL, HDL in the last 72 hours. Invalid input(s): LDLCALCU, TRIGLYCERIDE  ABGs:    Recent Labs     08/20/22  0500 08/20/22  0657 08/20/22  0930   PHART 7.236* 7.385 7.307*   LKJ2PYS 61.0* 45.4* 60.1*   PO2ART 351.5* 77.5 85.7   TYB6CQG 25.3 26.6 30*   BEART -3.4* 1.0 1.8   I1OSBKGZ 99.7 95.2 95   SCS1JHF 27.2 28.0 32       INR:   Recent Labs     08/20/22  0447 08/20/22  0930   INR 0.98 0.98     Lactate: No results for input(s): LACTATE in the last 72 hours. Cultures:  -----------------------------------------------------------------  RAD:   CTA HEAD W WO CONTRAST   Final Result      1. Evolving large parenchymal hematoma centered in the left basal ganglia with interval slight increase in size. Slight worsening of adjacent vasogenic edema and mass effect with subfalcine herniation and 6 mm midline shift to right. 2. No evidence of intracranial aneurysm, AVM or large vessel occlusion. XR ABDOMEN (KUB) (SINGLE AP VIEW)   Final Result      1. Nasogastric tube tip mid stomach. Assessment/Plan:   Robyn Coffey is a 78 y.o. female with unremarkable PMHx, presented with LOS, has been SOB for two weeks, CT showed IPH and STEMI on EKG. #Intraparenchymal left-sided hemorrhage (Altered Mental Status)  Patient presented with loss of consciousness to the ED, CT head showed intraparenchymal hemorrhage. She was hypertensive to 054 systolic blood pressure on presentation. Repeat CT 8/20 showed \"Evolving large parenchymal hematoma, subfalcine herniation and 6 mm midline shift to right\"    -Nicardipine drip 25 mg  -Kepra 1 g BID  -Maintain blood pressure less than 160  -Vital monitoring  -Consult neurology: Started on 3% Saline  -Consult Neurosurgery: Manitol x 2  -Neuro check Q 1 Hr  -Decatron for swelling  -Repeat CT wo contrast tomrrow?        #Anterolateral STEMI  Presented with loss of consciousness, and labored breathing. EKG in the ED showed STEMI. Repeat EKG 8/20 improved STEMI. -Cardiology consult. Spoke with cardiology . They are ok with holfing AP/AC given the patient has intracranial bleed.  -Atorvastatin   -Metoprolol 2.5 Qh4  -Continuous telemetry  -Echo: Pending       #Acute Respiratory Failure with Hypoxia and Hypercapnia  Patient had SOB, productive cough, recent STEMI which all can cause Respiratory failure. -Duoneb  Intubated: SPO2 94%  Vent settings: RR 18, FiO2 50%, PEEP 8, TD volume 400       #Pulmonary Edema 2/2 CHF  Pulmonary edema on CXR likely secondary to STEMI  - Monitor ins and Outs  - Echo: Pending      #Leuckocytosis  Most likely secondary to steroids. Patient is afebrile      Code Status:Full Code  FEN: NPO  PPX:  SCD. Not anticoagulated bcz she has [facility]  DISPO: From home to [facility], tranferred to [facility] ICU will be discharged to home? Allen Ha MD, PGY-1  08/21/22  9:31 AM    This patient has been staffed and discussed with Dr. Xander Nelson. Patient examined, findings as discussed with Romayne Ravel. Agree with assessment and plan. Presentation was remarkable for loss of consciousness and for hypoxemia and respiratory distress, requiring intubation for ventilator support. Initial chest imaging showed pulmonary edema pattern. Severe hypoxemia noted initially, has improved with continued support. Edema pattern has decreased, but still has right basilar pleural-parenchymal opacity suggestive of pleural effusion. It is likely she has had CHF prior to her presentation, per history. She has since had an episode of ventricular fibrillation, promptly resuscitated with DC countershock, without epinephrine, in the ICU. Neurologic status had improved earlier this morning, but agitation required institution of dexmedetomidine, which alters her neurologic examination.   Anticipate drug holiday in another 24 hours and reassess neurologic status, as well as ability to maintain spontaneous breathing. Acute inferolateral myocardial infarction is accompanied by left basal ganglion hemorrhage. .  This leaves us in a very difficult position of being unable to provide anticoagulation for cardiac intervention without high risk of worsening intracerebral bleeding. Discussed with neurology and cardiology.

## 2022-08-21 NOTE — PROGRESS NOTES
Clinical Pharmacy Progress Note    All IVs in NS - Management by Pharmacy    Consult Date(s): 8/20  Consulting Provider(s): Dr Sebas Boyce / Anna Messina Left IPH in setting of STEMI - All IVs in Normal Saline  Patient started Hypertonic 3% given slight increase in IPH and vasogenic edema with mass effect. Drips will be adjusted to normal saline as appropriate based on compatibility, in an effort to avoid fluid shifts, as D5W is osmotically active. The following intermittent IV drips / infusions have been adjusted to saline:  Levetiracetam   Nicardipine gtt  The following medications must remain in D5W due to incompatibility with normal saline:  None at this time  Note: Patient has dextrose ordered as part of hypoglycemia treatment protocol. Total IV fluid delivered to patient over last 24 hrs: ~912 mL  Pharmacist will follow daily to ensure all IVPBs / drips are in NS where possible. Thank you for consulting Pharmacy!     Antoine Mccabe PharmD., BCPS   8/21/2022 1:22 PM  Wireless: [phone]

## 2022-08-21 NOTE — PROGRESS NOTES
SIGNIFICANT EVENT:    Code blue was called at 4:22 , for bed 4508     At time of presentation patient was Unconscious     Interventions preformed/Labs drawn included: Code blue and CPR with defibrillation. On first pulse check it was V fib, Shock was given, on the second pulse check, pulse was restored and code was finished    Patient vitals now: HR: 89, BP: 126/77 , SpO2: 96, Temp: Afebrile. Patient deemed to be Medically stable now.

## 2022-08-21 NOTE — PROGRESS NOTES
NEUROLOGY / NEUROCRITICAL CARE PROGRESS NOTE       Patient Name: Francisco Fitzpatrick YOB: 1955   Sex: Female Age: 78 yrs     CC / Reason for Consult: ICH    Interval Hx / Changes over last 24 hours:   CT-A showed interval slight increased hemorrhage, slight worsening of edema - given 25g Mannitol x 2. Started on Decadron  Na 142 today  Agitated this AM with spike in BP - 199/130 mmHg   Followed commands for nursing recently but not for me. Now on Precedex. ROS: unable to assess given intubation    HISTORY   Admission HPI:  Francisco Fitzpatrick is a 78 y.o. y/o female with PMH significant for right eye prosthetic, neck surgery, and heavy tobacco use who presented to the hospital early this morning () after being found unresponsive by her sister on the couch. Upon arrival her SpO2 was 70%. She had reportedly been dyspneic over the last two weeks. She does not receive routine medical care. EKG done in the ER showed STEMI. CT of the head showed a large left BG intraparenchymal hemorrhage. She was hypertensive upon arrival and thus nicardipine was initiated. She was intubated and sent to ICU for further management. Her exam has not significantly improved despite no sedation (other than what was given for intubation). Bola Loaiza has 2 sons, her spouse is . Her sister is David Hall - [phone]. PMH No past medical history on file.    Allergies No Known Allergies   Diet Diet NPO   Isolation No active isolations     CURRENT SCHEDULED MEDICATIONS   Inpatient Medications     sodium chloride flush, 5-40 mL, IntraVENous, 2 times per day    insulin lispro, 0-4 Units, SubCUTAneous, Q6H    metoprolol, 2.5 mg, IntraVENous, Q4H    atorvastatin, 40 mg, Orogastric, Nightly    dexamethasone, 4 mg, IntraVENous, Q6H    levETIRAcetam, 1,000 mg, IntraVENous, Q12H    ipratropium-albuterol, 1 ampule, Inhalation, 4x daily   Infusions    dexmedetomidine (PRECEDEX) IV infusion 0.8 mcg/kg/hr (22 1001) sodium chloride      niCARdipine 2.5 mg/hr (08/21/22 1001)    dextrose                LABS   Metabolic Panel Recent Labs     08/20/22  0447 08/20/22  1340 08/21/22  0524     --  142   K 4.6  --  4.2     --  104   CO2 30  --  24   BUN 23*  --  26*   CREATININE 0.8  --  1.0   GLUCOSE 258*  --  146*   CALCIUM 9.2  --  8.6   LABALBU 4.3  --  3.5   PHOS  --   --  3.8   MG  --  1.40*  --    ALKPHOS 80  --   --    ALT 41*  --   --    AST 27  --   --       CBC / Coags Recent Labs     08/20/22  0447 08/20/22  0930 08/21/22  0525   WBC 13.0*  --  23.5*   RBC 6.35*  --  5.49*   HGB 15.8  --  13.3   HCT 49.7*  --  42.3     --  226   INR 0.98 0.98  --       Other Recent Labs     08/20/22  0640   COVID19 Not Detected     Recent Labs     08/20/22  0930   LACTA 1.4        DIAGNOSTICS   IMAGES:  Images personally reviewed and agree w/ radiology interpretation. Head CT w/o Contrast:  Head CT w/o Contrast  Acute intraparenchymal hemorrhage seen centered within the left basal ganglia suggestive of a hypertensive hemorrhage, measuring 3.0 x 6.1 x 3.9 cm in size with associated mild adjacent vasogenic edema, mass effect and minimal left-to-right midline shift measuring 4 mm. CT-A head  Evolving large parenchymal hematoma centered in the left basal ganglia with interval slight increase in size.   Slight worsening of adjacent vasogenic edema and mass-effect with subfalcine herniation and 6 mm midline shift to the right  No evidence of infarct cranial aneurysm, AVM or large vessel occlusion    PHYSICAL EXAMINATION     PHYSICAL EXAM:  Vitals:    08/21/22 0700 08/21/22 0747 08/21/22 0800 08/21/22 0829   BP: (!) 144/68      Pulse: 92 (!) 110  (!) 122   Resp:       Temp:   99.1 °F (37.3 °C)    TempSrc:       SpO2: 92% 93%  91%   Weight:           Mental status:  GCS   Best Eye Response  - No eye opening (1)  Best Verbal Response  - Intubated (T)  Best Motor response  - Localizes pain (5)     Does Not open eyes to auditory/tactile/painful stimulation      CN2: Visual Fields: no blink to either side with threat  CN 3,4,6: Extraocular muscles absent with doll's maneuver or tracking  Gaze is conjugate  Pupils equal, round, reactive to light  Right Pupil fixed - false eye  Left Pupil 3 to 2 mm and brisk  CN5: Corneal reflexes intact on the left (right eye false)  CN7: Face symmetric but exam limited by ET tube     CN9,11: Cough reflex present; gag reflex present     Motor Exam:  RUE: No movement  (0/5)  RLE: flexes at the knee in bed with popliteal pinch, which is improved from yesterday (0/5)  LUE: Antigravity (3/5)  LLE: antigravity (3/5)     Deep tendon reflexes:   Brisk throughout  Crossed adductors  Toes silent     Sensory:  Grimaces with pain in all four limbs  Responds with movement on the left (attempts to localize with LUE and LLE) when pain is applied to all four limbs      Tone: normal in all 4 extremities     OTHER SYSTEMS:  Cardiovascular: Warm, appears well perfused   Respiratory: Easy, non-labored respiratory pattern   Abdominal: Abdomen is without distention   Extremities: Upper and lower extremities are atraumatic in appearance without deformity. No swelling or erythema. ASSESSMENT & RECOMMENDATIONS   Ms. Angelique Albert is a 78year old lady without routine medical care, with a significant smoking history and false right eye who presents with acutely altered mental status found to have large left BG ICH and STEMI. Exam slightly better today though CT done yesterday showed slightly increased hemorrhage and edema - given mannitol x 2. Will start 3% today. RECOMMENDATIONS:   - No anticoagulation or antiplatelets for now given large ICH.  Will need to determine if/when she will be safe for coronary intervention     NEURO EXAMS:  Neurologic Exams Q1H  NIHSS on admission & Qshift     DIAGNOSTIC IMAGING  Repeat head CT tomorrow 8/22/22     ICU MANAGEMENT  Airway Management  Supplemental O2 to maintain SaO2 > 95%  Titrate ventilator to maintain PaO2 >100 mm Hg  Keep PaCO2 Normalized  Minimize sedation as able. Currently on precedex. Hemodynamic management  SBP goal less than 160 mmHg  IV Intermittent dose: Labetalol 10-20mg  IV continuous infusion: Nicardipine 2.5mg - 20mg, titrate Q5 minutes to desired effect  IN ALL PATIENTS WITH SUSPECTED OR CONFIRMED ELEVATED ICP KEEP CEREBRAL PERFUSION PRESSURE >60 (MAP-ICP = CPP)  DVT Prophylaxis  SCDs bilateral Lower Extremities. Given worsened HCT 8/20 will hold off on sq Heparin  General Care Issues  Sodium:  will start 3% NaCl - goal 145-155  Heme: Keep Plts ? 100K, Keep INR ? 1.4  Temperature: Goal is normothermia. Culture for fever > 101.5 F      JOE Wu - CNP   Neurology & Neurocritical Care   Neurology Line: [phone]  [employer]: [facility] Neurology & Neuro Critical Care NPs  8/21/2022 10:21 AM    I spent 45 minutes in the care of this patient. Over 50% of that time was in face-to-face counseling regarding disease process, diagnostic testing, preventative measures, and answering patient and family questions.

## 2022-08-21 NOTE — PROGRESS NOTES
3% Saline gtt started. Lab called for butterfly needles due to Q2 NA draw. No butterfly needles are available at this time. Spoke with ICU team, order for PICC placed.

## 2022-08-21 NOTE — RT PROTOCOL NOTE
RT Nebulizer Bronchodilator Protocol Note    There is a bronchodilator order in the chart from a provider indicating to follow the RT Bronchodilator Protocol and there is an Initiate RT Bronchodilator Protocol order as well (see protocol at bottom of note). CXR Findings:  XR CHEST PORTABLE    Result Date: 8/20/2022  Multifocal infiltrates seen throughout the lungs bilaterally with a small right-sided pleural effusion concerning for multi lobar pneumonia, though pulmonary edema could have a similar appearance. The endotracheal tube is felt to measure approximately 2 cm above the annemarie. Enteric catheter terminates below the level the film. The findings from the last RT Protocol Assessment were as follows:  Smoking: Chronic pulmonary disease  Respiratory Pattern: Regular pattern and RR 12-20 bpm  Breath Sounds: Inspiratory and expiratory or bilateral wheezing and/or rhonchi  Cough: Strong, productive  Indication for Bronchodilator Therapy:    Bronchodilator Assessment Score: 9  Pt benefits from Q4 schedule. Aerosolized bronchodilator medication orders have been revised according to the RT Nebulizer Bronchodilator Protocol below. Respiratory Therapist to perform RT Therapy Protocol Assessment initially then follow the protocol. Repeat RT Therapy Protocol Assessment PRN for score 0-3 or on second treatment, BID, and PRN for scores above 3. No Indications - adjust the frequency to every 6 hours PRN wheezing or bronchospasm, if no treatments needed after 48 hours then discontinue using Per Protocol order mode. If indication present, adjust the RT bronchodilator orders based on the Bronchodilator Assessment Score as indicated below. If a patient is on this medication at home then do not decrease Frequency below that used at home.     0-3 - enter or revise RT bronchodilator order(s) to equivalent RT Bronchodilator order with Frequency of every 4 hours PRN for wheezing or increased work of breathing using Per Protocol order mode. 4-6 - enter or revise RT Bronchodilator order(s) to two equivalent RT bronchodilator orders with one order with BID Frequency and one order with Frequency of every 4 hours PRN wheezing or increased work of breathing using Per Protocol order mode. 7-10 - enter or revise RT Bronchodilator order(s) to two equivalent RT bronchodilator orders with one order with TID Frequency and one order with Frequency of every 4 hours PRN wheezing or increased work of breathing using Per Protocol order mode. 11-13 - enter or revise RT Bronchodilator order(s) to one equivalent RT bronchodilator order with QID Frequency and an Albuterol order with Frequency of every 4 hours PRN wheezing or increased work of breathing using Per Protocol order mode. Greater than 13 - enter or revise RT Bronchodilator order(s) to one equivalent RT bronchodilator order with every 4 hours Frequency and an Albuterol order with Frequency of every 2 hours PRN wheezing or increased work of breathing using Per Protocol order mode. RT to enter RT Home Evaluation for COPD & MDI Assessment order using Per Protocol order mode.     Electronically signed by Keisha Rivas RCP on 8/21/2022 at 2:07 PM

## 2022-08-21 NOTE — PROGRESS NOTES
Pt. Very agitated this morning. B/P 199/130's, RR 30, -130. She was also diaphoretic, however, following commands in [place]. Spoke with residents, nicardipine gtt restarted, precedex gtt started as well.

## 2022-08-21 NOTE — PROGRESS NOTES
Received phone call from [facility], pt. Vfib arrest. Code blue called. CPR initiated, one shock delivered. Pt. In NSR post arrest, b/p stable. Family at bedside. Discussed code status in depth. Son wishes to remain a full code at this time.

## 2022-08-22 ENCOUNTER — APPOINTMENT (OUTPATIENT)
Dept: VASCULAR LAB | Age: 67
DRG: 064 | End: 2022-08-22
Attending: INTERNAL MEDICINE

## 2022-08-22 ENCOUNTER — APPOINTMENT (OUTPATIENT)
Dept: CT IMAGING | Age: 67
DRG: 064 | End: 2022-08-22
Attending: INTERNAL MEDICINE

## 2022-08-22 VITALS
BODY MASS INDEX: 29.61 KG/M2 | DIASTOLIC BLOOD PRESSURE: 38 MMHG | WEIGHT: 150.35 LBS | HEART RATE: 127 BPM | OXYGEN SATURATION: 92 % | SYSTOLIC BLOOD PRESSURE: 62 MMHG | TEMPERATURE: 99.4 F | RESPIRATION RATE: 33 BRPM

## 2022-08-22 PROBLEM — I99.8 ISCHEMIC LEG: Status: ACTIVE | Noted: 2022-08-22

## 2022-08-22 PROBLEM — I46.9 CARDIAC ARREST (HCC): Status: ACTIVE | Noted: 2022-08-22

## 2022-08-22 LAB
ALBUMIN SERPL-MCNC: 3 G/DL (ref 3.4–5)
ALBUMIN SERPL-MCNC: 3 G/DL (ref 3.4–5)
ALBUMIN SERPL-MCNC: 3.1 G/DL (ref 3.4–5)
ALP BLD-CCNC: 55 U/L (ref 40–129)
ALT SERPL-CCNC: 147 U/L (ref 10–40)
ANION GAP SERPL CALCULATED.3IONS-SCNC: 10 MMOL/L (ref 3–16)
ANION GAP SERPL CALCULATED.3IONS-SCNC: 12 MMOL/L (ref 3–16)
ANION GAP SERPL CALCULATED.3IONS-SCNC: 14 MMOL/L (ref 3–16)
ANISOCYTOSIS: ABNORMAL
AST SERPL-CCNC: 283 U/L (ref 15–37)
BANDED NEUTROPHILS RELATIVE PERCENT: 1 % (ref 0–7)
BASOPHILS ABSOLUTE: 0 K/UL (ref 0–0.2)
BASOPHILS RELATIVE PERCENT: 0 %
BILIRUB SERPL-MCNC: 0.7 MG/DL (ref 0–1)
BILIRUBIN DIRECT: <0.2 MG/DL (ref 0–0.3)
BILIRUBIN, INDIRECT: ABNORMAL MG/DL (ref 0–1)
BUN BLDV-MCNC: 58 MG/DL (ref 7–20)
BUN BLDV-MCNC: 65 MG/DL (ref 7–20)
BUN BLDV-MCNC: 73 MG/DL (ref 7–20)
CALCIUM IONIZED: 0.99 MMOL/L (ref 1.12–1.32)
CALCIUM SERPL-MCNC: 7.7 MG/DL (ref 8.3–10.6)
CALCIUM SERPL-MCNC: 7.8 MG/DL (ref 8.3–10.6)
CALCIUM SERPL-MCNC: 8.2 MG/DL (ref 8.3–10.6)
CHLORIDE BLD-SCNC: 116 MMOL/L (ref 99–110)
CHLORIDE BLD-SCNC: 117 MMOL/L (ref 99–110)
CHLORIDE BLD-SCNC: 118 MMOL/L (ref 99–110)
CO2: 20 MMOL/L (ref 21–32)
CO2: 22 MMOL/L (ref 21–32)
CO2: 24 MMOL/L (ref 21–32)
CREAT SERPL-MCNC: 2 MG/DL (ref 0.6–1.2)
CREAT SERPL-MCNC: 2.1 MG/DL (ref 0.6–1.2)
CREAT SERPL-MCNC: 2.4 MG/DL (ref 0.6–1.2)
EOSINOPHILS ABSOLUTE: 0 K/UL (ref 0–0.6)
EOSINOPHILS RELATIVE PERCENT: 0 %
FIBRINOGEN: 263 MG/DL (ref 207–509)
GFR AFRICAN AMERICAN: 24
GFR AFRICAN AMERICAN: 29
GFR AFRICAN AMERICAN: 30
GFR NON-AFRICAN AMERICAN: 20
GFR NON-AFRICAN AMERICAN: 24
GFR NON-AFRICAN AMERICAN: 25
GLUCOSE BLD-MCNC: 124 MG/DL (ref 70–99)
GLUCOSE BLD-MCNC: 137 MG/DL (ref 70–99)
GLUCOSE BLD-MCNC: 137 MG/DL (ref 70–99)
GLUCOSE BLD-MCNC: 140 MG/DL (ref 70–99)
GLUCOSE BLD-MCNC: 147 MG/DL (ref 70–99)
GLUCOSE BLD-MCNC: 158 MG/DL (ref 70–99)
GLUCOSE BLD-MCNC: 80 MG/DL (ref 70–99)
HCT VFR BLD CALC: 39.1 % (ref 36–48)
HEMOGLOBIN: 12.4 G/DL (ref 12–16)
INR BLD: 1.37 (ref 0.87–1.14)
LACTIC ACID: 2 MMOL/L (ref 0.4–2)
LACTIC ACID: 2.6 MMOL/L (ref 0.4–2)
LACTIC ACID: 3.9 MMOL/L (ref 0.4–2)
LV EF: 33 %
LVEF MODALITY: NORMAL
LYMPHOCYTES ABSOLUTE: 0.7 K/UL (ref 1–5.1)
LYMPHOCYTES RELATIVE PERCENT: 3 %
MACROCYTES: ABNORMAL
MAGNESIUM: 2.9 MG/DL (ref 1.8–2.4)
MCH RBC QN AUTO: 24.6 PG (ref 26–34)
MCHC RBC AUTO-ENTMCNC: 31.6 G/DL (ref 31–36)
MCV RBC AUTO: 77.6 FL (ref 80–100)
MICROCYTES: ABNORMAL
MONOCYTES ABSOLUTE: 1.1 K/UL (ref 0–1.3)
MONOCYTES RELATIVE PERCENT: 5 %
NEUTROPHILS ABSOLUTE: 20 K/UL (ref 1.7–7.7)
NEUTROPHILS RELATIVE PERCENT: 91 %
PDW BLD-RTO: 16.6 % (ref 12.4–15.4)
PERFORMED ON: ABNORMAL
PERFORMED ON: NORMAL
PH VENOUS: 7.36 (ref 7.35–7.45)
PHOSPHORUS: 7.1 MG/DL (ref 2.5–4.9)
PHOSPHORUS: 7.4 MG/DL (ref 2.5–4.9)
PLATELET # BLD: 148 K/UL (ref 135–450)
PLATELET SLIDE REVIEW: ADEQUATE
PMV BLD AUTO: 8.3 FL (ref 5–10.5)
POTASSIUM REFLEX MAGNESIUM: 5.6 MMOL/L (ref 3.5–5.1)
POTASSIUM SERPL-SCNC: 5.2 MMOL/L (ref 3.5–5.1)
POTASSIUM SERPL-SCNC: 5.7 MMOL/L (ref 3.5–5.1)
PROTHROMBIN TIME: 16.8 SEC (ref 11.7–14.5)
RBC # BLD: 5.04 M/UL (ref 4–5.2)
SODIUM BLD-SCNC: 148 MMOL/L (ref 136–145)
SODIUM BLD-SCNC: 149 MMOL/L (ref 136–145)
SODIUM BLD-SCNC: 150 MMOL/L (ref 136–145)
SODIUM BLD-SCNC: 151 MMOL/L (ref 136–145)
SODIUM BLD-SCNC: 151 MMOL/L (ref 136–145)
SODIUM BLD-SCNC: 152 MMOL/L (ref 136–145)
TOTAL PROTEIN: 5.8 G/DL (ref 6.4–8.2)
WBC # BLD: 21.7 K/UL (ref 4–11)

## 2022-08-22 PROCEDURE — 6370000000 HC RX 637 (ALT 250 FOR IP): Performed by: STUDENT IN AN ORGANIZED HEALTH CARE EDUCATION/TRAINING PROGRAM

## 2022-08-22 PROCEDURE — 94003 VENT MGMT INPAT SUBQ DAY: CPT

## 2022-08-22 PROCEDURE — 2580000003 HC RX 258: Performed by: STUDENT IN AN ORGANIZED HEALTH CARE EDUCATION/TRAINING PROGRAM

## 2022-08-22 PROCEDURE — 2000000000 HC ICU R&B

## 2022-08-22 PROCEDURE — 2580000003 HC RX 258: Performed by: NURSE PRACTITIONER

## 2022-08-22 PROCEDURE — 6360000002 HC RX W HCPCS: Performed by: STUDENT IN AN ORGANIZED HEALTH CARE EDUCATION/TRAINING PROGRAM

## 2022-08-22 PROCEDURE — 99255 IP/OBS CONSLTJ NEW/EST HI 80: CPT | Performed by: SURGERY

## 2022-08-22 PROCEDURE — 6360000002 HC RX W HCPCS

## 2022-08-22 PROCEDURE — 6360000004 HC RX CONTRAST MEDICATION

## 2022-08-22 PROCEDURE — 85610 PROTHROMBIN TIME: CPT

## 2022-08-22 PROCEDURE — 80076 HEPATIC FUNCTION PANEL: CPT

## 2022-08-22 PROCEDURE — 99291 CRITICAL CARE FIRST HOUR: CPT | Performed by: INTERNAL MEDICINE

## 2022-08-22 PROCEDURE — 99233 SBSQ HOSP IP/OBS HIGH 50: CPT | Performed by: INTERNAL MEDICINE

## 2022-08-22 PROCEDURE — 83735 ASSAY OF MAGNESIUM: CPT

## 2022-08-22 PROCEDURE — 94640 AIRWAY INHALATION TREATMENT: CPT

## 2022-08-22 PROCEDURE — 93925 LOWER EXTREMITY STUDY: CPT

## 2022-08-22 PROCEDURE — 80069 RENAL FUNCTION PANEL: CPT

## 2022-08-22 PROCEDURE — 84295 ASSAY OF SERUM SODIUM: CPT

## 2022-08-22 PROCEDURE — 82330 ASSAY OF CALCIUM: CPT

## 2022-08-22 PROCEDURE — 85025 COMPLETE CBC W/AUTO DIFF WBC: CPT

## 2022-08-22 PROCEDURE — 70450 CT HEAD/BRAIN W/O DYE: CPT

## 2022-08-22 PROCEDURE — 83605 ASSAY OF LACTIC ACID: CPT

## 2022-08-22 PROCEDURE — 94761 N-INVAS EAR/PLS OXIMETRY MLT: CPT

## 2022-08-22 PROCEDURE — 2500000003 HC RX 250 WO HCPCS: Performed by: STUDENT IN AN ORGANIZED HEALTH CARE EDUCATION/TRAINING PROGRAM

## 2022-08-22 PROCEDURE — 36415 COLL VENOUS BLD VENIPUNCTURE: CPT

## 2022-08-22 PROCEDURE — 6370000000 HC RX 637 (ALT 250 FOR IP): Performed by: INTERNAL MEDICINE

## 2022-08-22 PROCEDURE — C8929 TTE W OR WO FOL WCON,DOPPLER: HCPCS

## 2022-08-22 PROCEDURE — 93005 ELECTROCARDIOGRAM TRACING: CPT | Performed by: STUDENT IN AN ORGANIZED HEALTH CARE EDUCATION/TRAINING PROGRAM

## 2022-08-22 PROCEDURE — 2700000000 HC OXYGEN THERAPY PER DAY

## 2022-08-22 PROCEDURE — 85384 FIBRINOGEN ACTIVITY: CPT

## 2022-08-22 RX ORDER — IPRATROPIUM BROMIDE AND ALBUTEROL SULFATE 2.5; .5 MG/3ML; MG/3ML
1 SOLUTION RESPIRATORY (INHALATION) 3 TIMES DAILY
Status: DISCONTINUED | OUTPATIENT
Start: 2022-08-22 | End: 2022-08-23 | Stop reason: HOSPADM

## 2022-08-22 RX ORDER — HYDRALAZINE HYDROCHLORIDE 20 MG/ML
10 INJECTION INTRAMUSCULAR; INTRAVENOUS EVERY 6 HOURS PRN
Status: DISCONTINUED | OUTPATIENT
Start: 2022-08-22 | End: 2022-08-23 | Stop reason: HOSPADM

## 2022-08-22 RX ORDER — 3% SODIUM CHLORIDE 3 G/100ML
25 INJECTION, SOLUTION INTRAVENOUS CONTINUOUS
Status: DISCONTINUED | OUTPATIENT
Start: 2022-08-22 | End: 2022-08-23 | Stop reason: HOSPADM

## 2022-08-22 RX ADMIN — METOPROLOL TARTRATE 2.5 MG: 5 INJECTION, SOLUTION INTRAVENOUS at 07:23

## 2022-08-22 RX ADMIN — SODIUM CHLORIDE 1000 MG: 9 INJECTION, SOLUTION INTRAVENOUS at 07:26

## 2022-08-22 RX ADMIN — SODIUM CHLORIDE, PRESERVATIVE FREE 10 ML: 5 INJECTION INTRAVENOUS at 20:01

## 2022-08-22 RX ADMIN — METOPROLOL TARTRATE 2.5 MG: 5 INJECTION, SOLUTION INTRAVENOUS at 14:17

## 2022-08-22 RX ADMIN — METOPROLOL TARTRATE 2.5 MG: 5 INJECTION, SOLUTION INTRAVENOUS at 17:20

## 2022-08-22 RX ADMIN — NICARDIPINE HYDROCHLORIDE 10 MG/HR: 2.5 INJECTION, SOLUTION INTRAVENOUS at 12:31

## 2022-08-22 RX ADMIN — SODIUM CHLORIDE, PRESERVATIVE FREE 10 ML: 5 INJECTION INTRAVENOUS at 20:02

## 2022-08-22 RX ADMIN — IPRATROPIUM BROMIDE AND ALBUTEROL SULFATE 1 AMPULE: 2.5; .5 SOLUTION RESPIRATORY (INHALATION) at 07:54

## 2022-08-22 RX ADMIN — NICARDIPINE HYDROCHLORIDE 10 MG/HR: 2.5 INJECTION, SOLUTION INTRAVENOUS at 14:52

## 2022-08-22 RX ADMIN — SODIUM CHLORIDE, PRESERVATIVE FREE 10 ML: 5 INJECTION INTRAVENOUS at 07:49

## 2022-08-22 RX ADMIN — NICARDIPINE HYDROCHLORIDE 10 MG/HR: 2.5 INJECTION, SOLUTION INTRAVENOUS at 17:14

## 2022-08-22 RX ADMIN — ACETAMINOPHEN 650 MG: 325 TABLET ORAL at 17:40

## 2022-08-22 RX ADMIN — SODIUM CHLORIDE 35 ML/HR: 3 INJECTION, SOLUTION INTRAVENOUS at 03:18

## 2022-08-22 RX ADMIN — METOPROLOL TARTRATE 2.5 MG: 5 INJECTION, SOLUTION INTRAVENOUS at 05:12

## 2022-08-22 RX ADMIN — NICARDIPINE HYDROCHLORIDE 10 MG/HR: 2.5 INJECTION, SOLUTION INTRAVENOUS at 19:43

## 2022-08-22 RX ADMIN — METOPROLOL TARTRATE 2.5 MG: 5 INJECTION, SOLUTION INTRAVENOUS at 01:41

## 2022-08-22 RX ADMIN — DEXAMETHASONE SODIUM PHOSPHATE 4 MG: 4 INJECTION, SOLUTION INTRAMUSCULAR; INTRAVENOUS at 05:12

## 2022-08-22 RX ADMIN — NICARDIPINE HYDROCHLORIDE 10 MG/HR: 2.5 INJECTION, SOLUTION INTRAVENOUS at 10:25

## 2022-08-22 RX ADMIN — IPRATROPIUM BROMIDE AND ALBUTEROL SULFATE 1 AMPULE: 2.5; .5 SOLUTION RESPIRATORY (INHALATION) at 13:58

## 2022-08-22 RX ADMIN — IPRATROPIUM BROMIDE AND ALBUTEROL SULFATE 1 AMPULE: 2.5; .5 SOLUTION RESPIRATORY (INHALATION) at 20:01

## 2022-08-22 RX ADMIN — PERFLUTREN 1.65 MG: 6.52 INJECTION, SUSPENSION INTRAVENOUS at 10:05

## 2022-08-22 RX ADMIN — SODIUM CHLORIDE 1000 MG: 9 INJECTION, SOLUTION INTRAVENOUS at 20:01

## 2022-08-22 ASSESSMENT — PULMONARY FUNCTION TESTS
PIF_VALUE: 25
PIF_VALUE: 18
PIF_VALUE: 27
PIF_VALUE: 28
PIF_VALUE: 25
PIF_VALUE: 26
PIF_VALUE: 26
PIF_VALUE: 25
PIF_VALUE: 24
PIF_VALUE: 27
PIF_VALUE: 25
PIF_VALUE: 25
PIF_VALUE: 27
PIF_VALUE: 25
PIF_VALUE: 22
PIF_VALUE: 25
PIF_VALUE: 25
PIF_VALUE: 27
PIF_VALUE: 26
PIF_VALUE: 24
PIF_VALUE: 27
PIF_VALUE: 25
PIF_VALUE: 26
PIF_VALUE: 26
PIF_VALUE: 25
PIF_VALUE: 24
PIF_VALUE: 25
PIF_VALUE: 25
PIF_VALUE: 26
PIF_VALUE: 25
PIF_VALUE: 26
PIF_VALUE: 26
PIF_VALUE: 23
PIF_VALUE: 33
PIF_VALUE: 27
PIF_VALUE: 26
PIF_VALUE: 24
PIF_VALUE: 26
PIF_VALUE: 23
PIF_VALUE: 29
PIF_VALUE: 26
PIF_VALUE: 26
PIF_VALUE: 25
PIF_VALUE: 20
PIF_VALUE: 25
PIF_VALUE: 25
PIF_VALUE: 26
PIF_VALUE: 23
PIF_VALUE: 25
PIF_VALUE: 26
PIF_VALUE: 24
PIF_VALUE: 25
PIF_VALUE: 24
PIF_VALUE: 26
PIF_VALUE: 25
PIF_VALUE: 26
PIF_VALUE: 24
PIF_VALUE: 25
PIF_VALUE: 24
PIF_VALUE: 26
PIF_VALUE: 25
PIF_VALUE: 26
PIF_VALUE: 23
PIF_VALUE: 27
PIF_VALUE: 26
PIF_VALUE: 27
PIF_VALUE: 25
PIF_VALUE: 26
PIF_VALUE: 25
PIF_VALUE: 24
PIF_VALUE: 27
PIF_VALUE: 24
PIF_VALUE: 24
PIF_VALUE: 25
PIF_VALUE: 24
PIF_VALUE: 25
PIF_VALUE: 24
PIF_VALUE: 25
PIF_VALUE: 25
PIF_VALUE: 24
PIF_VALUE: 26
PIF_VALUE: 14
PIF_VALUE: 27
PIF_VALUE: 27
PIF_VALUE: 25

## 2022-08-22 NOTE — PROGRESS NOTES
NEUROLOGY / NEUROCRITICAL CARE PROGRESS NOTE       Patient Name: Higinio Cavanaugh YOB: 1955   Sex: Female Age: 78 yrs     CC / Reason for Consult: ICH    Interval Hx / Changes over last 24 hours:   Na 152  Few incidences of unresponsiveness on sedation overnight, sedation was stopped and she did become agitated and was moving her L side. Head CT repeated overnight which demonstrated stable bleed. Mottling in lower extremities, nurse reports loss of pedal pulses, ICU team aware and managing    ROS: unable to assess given intubation    HISTORY   Admission HPI:  Higinio Cavanaugh is a 78 y.o. y/o female with PMH significant for right eye prosthetic, neck surgery, and heavy tobacco use who presented to the hospital early this morning () after being found unresponsive by her sister on the couch. Upon arrival her SpO2 was 70%. She had reportedly been dyspneic over the last two weeks. She does not receive routine medical care. EKG done in the ER showed STEMI. CT of the head showed a large left BG intraparenchymal hemorrhage. She was hypertensive upon arrival and thus nicardipine was initiated. She was intubated and sent to ICU for further management. Her exam has not significantly improved despite no sedation (other than what was given for intubation). Jasmine Hood has 2 sons, her spouse is . Her sister is Breanna [facility] - [phone]. PMH No past medical history on file.    Allergies No Known Allergies   Diet Diet NPO   Isolation No active isolations     CURRENT SCHEDULED MEDICATIONS   Inpatient Medications     lidocaine 1 % injection, 5 mL, IntraDERmal, Once    sodium chloride flush, 5-40 mL, IntraVENous, 2 times per day    sodium chloride flush, 5-40 mL, IntraVENous, 2 times per day    insulin lispro, 0-4 Units, SubCUTAneous, Q6H    metoprolol, 2.5 mg, IntraVENous, Q4H    atorvastatin, 40 mg, Orogastric, Nightly    levETIRAcetam, 1,000 mg, IntraVENous, Q12H    ipratropium-albuterol, 1 ampule, Inhalation, 4x daily   Infusions    sodium chloride      dexmedetomidine (PRECEDEX) IV infusion Stopped (08/22/22 0845)    sodium chloride      sodium chloride Stopped (08/21/22 2224)    niCARdipine 2.5 mg/hr (08/22/22 0630)    dextrose                LABS   Metabolic Panel Recent Labs     08/20/22 0447 08/20/22  1340 08/21/22  0524 08/21/22  1645 08/21/22  1924 08/22/22  0344 08/22/22  0601 08/22/22  0648 08/22/22  0800     --    < > 145   < > 149*  150* 150*  --  152*   K 4.6  --    < > 4.6  --  5.2*  --   --  5.7*     --    < > 108  --  116*  --   --  118*   CO2 30  --    < > 22  --  24  --   --  22   BUN 23*  --    < > 37*  --  58*  --   --  65*   CREATININE 0.8  --    < > 1.3*  --  2.0*  --   --  2.1*   GLUCOSE 258*  --    < > 184*  --  147*  --   --  140*   CALCIUM 9.2  --    < > 8.8  --  8.2*  --   --  7.8*   LABALBU 4.3  --    < > 3.2*  3.4  --  3.0*  --  3.1* 3.0*   PHOS  --   --    < > 4.8  --  7.1*  --   --  7.4*   MG  --  1.40*  --  3.30*  --   --   --   --  2.90*   ALKPHOS 80  --   --  79  --   --   --  55  --    ALT 41*  --   --  103*  --   --   --  147*  --    AST 27  --   --  142*  --   --   --  283*  --     < > = values in this interval not displayed. CBC / Coags Recent Labs     08/20/22 0447 08/20/22  0930 08/21/22  0525 08/21/22  1645 08/22/22  0344 08/22/22  0648   WBC 13.0*  --  23.5* 13.9* 21.7*  --    RBC 6.35*  --  5.49* 5.60* 5.04  --    HGB 15.8  --  13.3 13.5 12.4  --    HCT 49.7*  --  42.3 44.1 39.1  --      --  226 192 148  --    INR 0.98 0.98  --   --   --  1.37*        Other Recent Labs     08/20/22  0640   COVID19 Not Detected       Recent Labs     08/22/22  0602   LACTA 2.0          DIAGNOSTICS   IMAGES:  Images personally reviewed and agree w/ radiology interpretation. CT Head WO Contrast 8/21 @ 2300: Impression       1. Left basal ganglia intracerebral hemorrhage, not significantly changed.      Previous Imaging:  Head CT w/o Contrast:  Head CT w/o Contrast  Acute intraparenchymal hemorrhage seen centered within the left basal ganglia suggestive of a hypertensive hemorrhage, measuring 3.0 x 6.1 x 3.9 cm in size with associated mild adjacent vasogenic edema, mass effect and minimal left-to-right midline shift measuring 4 mm. CT-A head  Evolving large parenchymal hematoma centered in the left basal ganglia with interval slight increase in size.   Slight worsening of adjacent vasogenic edema and mass-effect with subfalcine herniation and 6 mm midline shift to the right  No evidence of infarct cranial aneurysm, AVM or large vessel occlusion    PHYSICAL EXAMINATION     PHYSICAL EXAM:  Vitals:    08/22/22 0615 08/22/22 0630 08/22/22 0645 08/22/22 0754   BP: (!) 148/72 (!) 156/86 (!) 155/78    Pulse: 83 82 84 82   Resp:       Temp:       TempSrc:       SpO2: 98% 99% 99% 98%   Weight:           Mental status:  GCS   Best Eye Response  - No eye opening (1)  Best Verbal Response  - Intubated (T)  Best Motor response  - Abnormal flexion (3)     Does Not open eyes to auditory/tactile/painful stimulation      CN2: Visual Fields: no blink to either side with threat  CN 3,4,6: Some movement with L eye to the R with oculocephalic manuever  Gaze is conjugate  Pupils equal, round, reactive to light  Right Pupil fixed - false eye  Left Pupil 3 to 2 mm and brisk  CN5: Corneal reflexes intact on the left (right eye false)  CN7: Face symmetric but exam limited by ET tube     CN9,11: Cough reflex present; gag reflex present     Motor Exam:  RUE: Slight flexion to painful stimulus  RLE: Mottled, cold, no movement to painful stimulus  LUE: Flexion to painful stimulus  LLE: Cold, no movement to painful stimulus     Deep tendon reflexes:   Brisk throughout       Sensory:  No grimace to painful stimulus     Tone: normal in all 4 extremities     OTHER SYSTEMS:  Cardiovascular: Warm, appears well perfused   Respiratory: Easy, non-labored respiratory pattern Abdominal: Abdomen is without distention   Extremities: Upper and lower extremities are atraumatic in appearance without deformity. No swelling or erythema. ASSESSMENT & RECOMMENDATIONS   Ms. Angelique Albert is a 78year old lady without routine medical care, with a significant smoking history and false right eye who presents with acutely altered mental status found to have large left BG ICH and STEMI. Exam slightly better 8/21 though CT done 8/20 showed slightly increased hemorrhage and edema - given mannitol x 2. Was on 3%, will discontinue today     RECOMMENDATIONS:   - No anticoagulation or antiplatelets for now given large ICH.   - Cardiology and vascular following, appreciate recs  - Palliative care following, appreciate assistance, patient has large [address] and there is concern that treatment of her other medical problems (STEMI, pulseless lower extremitites) would lead to worsening of her ICH     NEURO EXAMS:  Neurologic Exams Q1H  NIHSS on admission & Qshift     DIAGNOSTIC IMAGING  Repeat head CT overnight due to decreased responsiveness shows stable bleed  If change in Neurologic exam, repeat Head CT and notify Neurocritical Care     ICU MANAGEMENT  Airway Management  Supplemental O2 to maintain SaO2 > 95%  Titrate ventilator to maintain PaO2 >100 mm Hg  Keep PaCO2 Normalized  Minimize sedation as able. Currently off precedex due to decreased responsiveness  Hemodynamic management  SBP goal less than 160 mmHg  On scheduled metoprolol per cardiology   PRN Hydralazine 10 mg Q6  IV continuous infusion: Nicardipine 2.5mg - 20mg, titrate Q5 minutes to desired effect  IN ALL PATIENTS WITH SUSPECTED OR CONFIRMED ELEVATED ICP KEEP CEREBRAL PERFUSION PRESSURE >60 (MAP-ICP = CPP)  DVT Prophylaxis  SCDs bilateral Lower Extremities. General Care Issues  Sodium:  Discontinue 3%, allow Na to drift down, recheck level this evening  Heme: Keep Plts ?  100K, Keep INR ? 1.4, fibrinogen WNL  Temperature: Goal is normothermia. Culture for fever > 101.5 F      JOE Poole CNP   Neurology & Neurocritical Care   Neurology Line: [phone]  [employer]: [facility] Neurology & Neuro Critical Care NPs  8/22/2022 9:21 AM    I spent 45 minutes in the care of this patient. Over 50% of that time was in face-to-face counseling regarding disease process, diagnostic testing, preventative measures, and answering patient and family questions.

## 2022-08-22 NOTE — CONSULTS
[facility]  Palliative Medicine Consultation Note      Date Of Admission:8/20/2022  Date of consult: 08/22/22  Seen by [facility] in the past:  No    Recommendations:        Pt appears comfortable in bed. She is unresponsive to voice, only minimally responsive to pain. Spoke with pt's son, Caprice Costello, at the bedside and introduced palliative care. He has knowledge of the pt's medical condition, but he has low health literacy. He does not have a full understanding of why there cannot be any cardiac or vascular interventions. We discussed code status and the risk of the pt going into cardiac arrest again. He does not want to change her code status at this time. He admits to being overwhelmed. We discussed setting up a family meeting for tomorrow afternoon, Caprice Costello is going to talk with his family about this. Spoke with pt's younger son, Delfino aLmbert, via phone and introduced palliative care. He was emotional when hearing the update about her medical condition. Delfino Lambert does not drive, but when hearing that his mother's condition may not be recoverable, he says he will try to find a way here tomorrow. His speech was slurred and delayed, he then became so emotional, that he put his significant other, Jean Claude Dunn, on the phone. Medical update was provided to her as well. Will discuss transportation with case management. Plan for family meeting tomorrow afternoon. Will determine specific time tomorrow morning. 1. Goals of Care/Advanced Care planning/Code status: continue current medical management  2. Pain: CPOT = 0  3. SOB: mechanical ventilation  4.  Disposition: TBD - pending hospital course    Reason for Consult:         [x]  Goals of Care  [x]  Code Status Discussion/Advanced Care Planning   [x]  Psychosocial/Family Support  []  Symptom Management  []  Other (Specify)    Requesting Physician: Dr. Kristal Pérez:  loss of conciousness    History Obtained From:  electronic medical record    History of Present Illness:         Angélica Zepeda is a 78 y.o. female who presented with loss of consciousness from her home via EMS. She was with her sister, Ceasar Navarro, who stated she was feeling SOB and slowly became less responsive. EMS was called and she was brought to [address] ED. She does not get any primary medical care, so there is no known medical history. In the ED, she became hypoxic and was intubated. CT head showed large intraparenchymal left-sided hemorrhage. EKG showed anterolateral STEMI. Purulent drainage was found in R prosthetic eye, antibiotic drops started. Pt was transferred to [facility] ICU for neurologic management. Yesterday, 8/21, pt had a VFib arrest. ROSC achieved after 1 round. Palliative care was consulted for goals of care and code status discussions. Subjective:         Past Medical History:    No past medical history on file. Past Surgical History:        Procedure Laterality Date    EYE SURGERY      HYSTERECTOMY (CERVIX STATUS UNKNOWN)      NECK SURGERY         Current Medications:    Medications Prior to Admission: gabapentin (NEURONTIN) 100 MG capsule, Take 1 capsule by mouth 3 times daily for 30 days. Intended supply: 30 days    Allergies:  Patient has no known allergies. Social History:    TOBACCO: reports that she has been smoking cigarettes. She has been smoking an average of 1.5 packs per day. She has never used smokeless tobacco.  ETOH:   reports no history of alcohol use. Patient currently lives alone    Review of Systems -   Review of Systems   Unable to perform ROS: Intubated     Objective:        Physical Exam  Constitutional:       Appearance: She is ill-appearing. HENT:      Head: Normocephalic. Mouth/Throat:      Mouth: Mucous membranes are moist.   Eyes:      Comments: L pupil sluggish; R eye prosthetic    Cardiovascular:      Rate and Rhythm: Normal rate and regular rhythm.       Comments: Absent pedal pulses  Pulmonary:      Breath sounds: Normal breath sounds. Comments: Mechanical ventilation  Abdominal:      General: Abdomen is flat. Bowel sounds are normal.      Palpations: Abdomen is soft. Musculoskeletal:         General: Swelling present. Skin:     General: Skin is warm and dry. Capillary Refill: Capillary refill takes more than 3 seconds. Neurological:      Mental Status: She is disoriented. GCS: GCS eye subscore is 1. GCS verbal subscore is 1. GCS motor subscore is 3. Motor: Weakness present. Palliative Performance Scale:  [] 60% Ambulation reduced; Significant disease; Can't do hobbies/housework; intake normal or reduced; occasional assist; LOC full/confusion  [] 50% Mainly sit/lie; Extensive disease; Can't do any work; Considerable assist; intake normal  Or reduced; LOC full/confusion  [] 40% Mainly in bed; Extensive disease; Mainly assist; intake normal or reduced; occasional assist; LOC full/confusion  [] 30% Bed Bound; Extensive disease; Total care; intake reduced; LOC full/confusion  [] 20% Bed Bound; Extensive disease; Total care; intake minimal; Drowsy/coma  [x] 10% Bed Bound; Extensive disease; Total care; Mouth care only; Drowsy/coma  [] 0% Death    PPS: 10%    Vitals:    BP (!) 155/78   Pulse 82   Temp 98.4 °F (36.9 °C) (Oral)   Resp 18   Wt 150 lb 5.7 oz (68.2 kg)   SpO2 98%   BMI 29.61 kg/m²     Labs:    BMP:   Recent Labs     08/21/22  0524 08/21/22  1253 08/21/22  1645 08/21/22  1924 08/22/22  0153 08/22/22  0344 08/22/22  0601      < > 145   < > 150* 149*  150* 150*   K 4.2  --  4.6  --   --  5.2*  --      --  108  --   --  116*  --    CO2 24  --  22  --   --  24  --    BUN 26*  --  37*  --   --  58*  --    CREATININE 1.0  --  1.3*  --   --  2.0*  --    GLUCOSE 146*  --  184*  --   --  147*  --     < > = values in this interval not displayed.      CBC:   Recent Labs     08/21/22  0525 08/21/22  1645 08/22/22  0344   WBC 23.5* 13.9* 21.7*   HGB 13.3 13.5 12.4   HCT 42.3 44.1 39.1    192 148       LFT's:   Recent Labs     08/20/22  0447 08/21/22  1645 08/22/22  0648   AST 27 142* 283*   ALT 41* 103* 147*   BILITOT 0.7 0.6 0.7   ALKPHOS 80 79 55     Troponin:   Recent Labs     08/20/22  0649 08/20/22  0930 08/20/22  1340   TROPONINI 0.05* 0.54* 1.08*     BNP: No results for input(s): BNP in the last 72 hours. ABGs:   Recent Labs     08/21/22  0930 08/21/22  1645   PHART 7.424 7.350   OLQ8WQK 42.0 44.2   PO2ART 73.9* 114.0*     INR:   Recent Labs     08/20/22  0447 08/20/22  0930 08/22/22  0648   INR 0.98 0.98 1.37*       U/A:  Recent Labs     08/20/22  0640   COLORU Yellow   PHUR 6.5  6.5   WBCUA 3-5   RBCUA 5-10*   BACTERIA Rare*   CLARITYU Clear   SPECGRAV >=1.030   LEUKOCYTESUR Negative   UROBILINOGEN 0.2   BILIRUBINUR Negative   BLOODU SMALL*   GLUCOSEU 250*   AMORPHOUS Rare       CT HEAD WO CONTRAST   Final Result      1. Left basal ganglia intracerebral hemorrhage, not significantly changed. XR CHEST PORTABLE   Final Result      Right basilar airspace disease and pleural effusion      CTA HEAD W WO CONTRAST   Final Result      1. Evolving large parenchymal hematoma centered in the left basal ganglia with interval slight increase in size. Slight worsening of adjacent vasogenic edema and mass effect with subfalcine herniation and 6 mm midline shift to right. 2. No evidence of intracranial aneurysm, AVM or large vessel occlusion. XR ABDOMEN (KUB) (SINGLE AP VIEW)   Final Result      1. Nasogastric tube tip mid stomach. CT HEAD WO CONTRAST    (Results Pending)         Conclusion/Time spent:         Recommendations see above    Time spent with patient and/or family: 15  Time reviewing records: 10 min   Time communicating with staff: 5 min     A total of  minutes spent with the patient and family on unit greater than 50% in counseling regarding palliative care and in goals of care for the patient. Thank you to Dr. Selam Martinez for this consultation.  We will continue to follow Ms. Raghavendra's care as needed.     JOE Stevens - NP

## 2022-08-22 NOTE — PROGRESS NOTES
[address] Daily Progress Note      Admit Date:  8/20/2022    Subjective:  Ms. Laura Mascorro was seen and examined. F/U ICH/STEMI. Intubated.  Essentially unresponsive    Objective:   /70   Pulse 82   Temp 99.4 °F (37.4 °C) (Axillary)   Resp 18   Wt 150 lb 5.7 oz (68.2 kg)   SpO2 96%   BMI 29.61 kg/m²     Intake/Output Summary (Last 24 hours) at 8/22/2022 1025  Last data filed at 8/22/2022 1000  Gross per 24 hour   Intake 2027.62 ml   Output 370 ml   Net 1657.62 ml       TELEMETRY: Sinus     Physical Exam:  General:  Intubated  Skin:  Warm and dry  Neck:  JVP difficult  Chest:  BS equall  Cardiovascular:  RRR S1S2  Abdomen:  Soft quiet  Extremities:  tr edema    Medications:    ipratropium-albuterol  1 ampule Inhalation TID    sodium chloride flush  5-40 mL IntraVENous 2 times per day    sodium chloride flush  5-40 mL IntraVENous 2 times per day    insulin lispro  0-4 Units SubCUTAneous Q6H    metoprolol  2.5 mg IntraVENous Q4H    atorvastatin  40 mg Orogastric Nightly    levETIRAcetam  1,000 mg IntraVENous Q12H      sodium chloride      dexmedetomidine (PRECEDEX) IV infusion Stopped (08/22/22 0845)    sodium chloride      sodium chloride Stopped (08/21/22 2224)    niCARdipine 2.5 mg/hr (08/22/22 0630)    dextrose       sodium chloride flush, sodium chloride, morphine, sodium chloride flush, sodium chloride, ondansetron **OR** ondansetron, polyethylene glycol, acetaminophen **OR** acetaminophen, glucose, dextrose bolus **OR** dextrose bolus, glucagon (rDNA), dextrose    Lab Data:  CBC:   Recent Labs     08/21/22  0525 08/21/22  1645 08/22/22  0344   WBC 23.5* 13.9* 21.7*   HGB 13.3 13.5 12.4   HCT 42.3 44.1 39.1   MCV 77.0* 78.8* 77.6*    192 148     BMP:   Recent Labs     08/21/22  1645 08/21/22  1924 08/22/22  0344 08/22/22  0601 08/22/22  0800      < > 149*  150* 150* 152*   K 4.6  --  5.2*  --  5.7*     --  116*  --  118*   CO2 22  --  24  --  22   PHOS 4.8  --  7.1*  --  7.4* BUN 37*  --  58*  --  65*   CREATININE 1.3*  --  2.0*  --  2.1*    < > = values in this interval not displayed. LIVER PROFILE:   Recent Labs     08/20/22  0447 08/21/22  1645 08/22/22  0648   AST 27 142* 283*   ALT 41* 103* 147*   BILIDIR  --  <0.2 <0.2   BILITOT 0.7 0.6 0.7   ALKPHOS 80 79 55     PT/INR:   Recent Labs     08/20/22  0447 08/20/22  0930 08/22/22  0648   PROTIME 12.9 12.8 16.8*   INR 0.98 0.98 1.37*     APTT:   Recent Labs     08/20/22 0447   APTT 24.9     BNP:  No results for input(s): BNP in the last 72 hours. IMAGING:     Assessment:  Patient Active Problem List    Diagnosis Date Noted    Nontraumatic subcortical hemorrhage of left cerebral hemisphere ([facility] [address] 08/21/2022    Acute respiratory failure with hypoxia ([facility] [address] 08/21/2022    STEMI (ST elevation myocardial infarction) ([facility] [address] 08/20/2022    Acute cerebrovascular accident (CVA) of basal ganglia ([facility] [address] 08/20/2022       Plan:  Events noted. VF arrest with restoration of pulse after shock. Consider amio if recurs. This am with loss of pulses bilateral LE. Vascular surgery has few options in view of ICH also. Unresponsive. Neurosurgery following. IV B blocker/statin. No antiplatelets due to [address]        Core Measures:  Discharge instructions:   LVEF documented:   ACEI for LV dysfunction:   Smoking Cessation:    Nadia Valencia MD, MD 8/22/2022 10:25 AM

## 2022-08-22 NOTE — PROGRESS NOTES
Pt not responsive to painful stimulus in all extremities at 2200. L pupil still 2mm and reactive. ICU resident notified- precedex held and pt taken for STAT head CT. See RESULTS. Pt started to withdraw from pain again at 0000. Precedex restarted. Will continue to monitor for neuro changes. Pt to and from CT without complications. All safety precautions in place per unit protocol.

## 2022-08-22 NOTE — CARE COORDINATION
Case Management Assessment           Initial Evaluation                Date / Time of Evaluation: 8/22/2022 9:19 AM                 Assessment Completed by: Lopez Fernandez RN    Patient Name: Jenn Crowley     YOB: 1955  Diagnosis: STEMI (ST elevation myocardial infarction) [facility]) [I21.3]     Date / Time: 8/20/2022  7:58 AM    Patient Admission Status: Inpatient    If patient is discharged prior to next notation, then this note serves as note for discharge by case management. Current PCP: No primary care provider on file. Chart Reviewed: Yes  Patient/ Family Interviewed: No    Initial assessment completed at bedside with: Pt is currently intubated and unresponsive off sedation    Hospitalization in the last 30 days: No    Emergency Contacts:  Extended Emergency Contact Information  Primary Emergency Contact: Omayra Monae  [place] [address] [address]  Home Phone: [phone]  Work Phone: [phone]  Mobile Phone: [phone]  Relation: Brother/Sister  Secondary Emergency Contact: [address] Phone: [phone]  Relation: Child    Advance Directives:   Code Status: Full Code    Financial  Payor: /     Pharmacy  No Pharmacies [place] with self care and functional mobility pta    [place]: from home w family    Plans to RETURN to current housing: Yes    DISCHARGE PLAN:  Disposition: tbd    The Patient and/or patient representative Tatyana Du and her family were provided with a choice of provider and agrees with the discharge plan Yes    Freedom of choice list was provided with basic dialogue that supports the patient's individualized plan of care/goals and shares the quality data associated with the providers.  Yes    Lopez Fernandez RN  [facility]  Case Management Department  [phone]

## 2022-08-22 NOTE — PROGRESS NOTES
Physician Progress Note      PATIENT:               Trevor Butcher  CSN #:                  [id number]  :                       1955  ADMIT DATE:       2022 7:58 AM  DISCH DATE:  RESPONDING  PROVIDER #:        Chanetta Heimlich          QUERY TEXT:    Pt admitted with intraparenchymal left-sided hemorrhage. Pt noted to have   vasogenic edema, mass effect and left to right midline shift on head CT. If   clinically significant, please document in progress notes and discharge   summary if you are evaluating/treating any of the following: The medical record reflects the following:  Risk Factors: 78year old female with intraparenchymal left-sided hemorrhage  Clinical Indicators: CTA head 22- Evolving large parenchymal hematoma   centered in the left basal ganglia with interval slight increase in size. Slight worsening of adjacent vasogenic edema and mass effect with subfalcine   herniation and 6 mm midline shift to right. CT head with contrast 22-   Surrounding vasogenic edema. No significant new intracranial hemorrhage. Mass   effect is present with effacement of the left lateral ventricle and right to   left midline shift of approximately 8 mm, unchanged. Treatment: Imaging, mannitol, decadron, neurosurgery consult  Options provided:  -- Cerebral edema and Brain compression  -- Cerebral edema  -- Brain compression  -- Other - I will add my own diagnosis  -- Disagree - Not applicable / Not valid  -- Disagree - Clinically unable to determine / Unknown  -- Refer to Clinical Documentation Reviewer    PROVIDER RESPONSE TEXT:    This patient has cerebral edema. Query created by:  Adriana Ca on 2022 9:47 AM      Electronically signed by:  Chanetta Heimlich 2022 1:24 PM

## 2022-08-22 NOTE — PROGRESS NOTES
Spoke with Austen Ramirez RN regarding PICC order. PICC not needed at this time, will d/c order. Call PICC team with further questions.

## 2022-08-22 NOTE — PROGRESS NOTES
08/22/22 1527   Encounter Summary   Encounter Overview/Reason  Initial Encounter  (Notes reflect time with Son.)   Service Provided For: Patient and family together  (Time here was with William Ochoa who was at bedside.)   Referral/Consult From: [facility]   Last Encounter  08/22/22   Complexity of Encounter High   Begin Time 1400   End Time  1440   Total Time Calculated 40 min   Encounter    Type Initial Screen/Assessment   Grief, Loss, and Adjustments   Type Anticipatory Grief   Assessment/Intervention/Outcome   Assessment Anxious; Despair; Powerlessness   Intervention Active listening;Discussed belief system/Mosque practices/shana;Discussed death, afterlife; Discussed illness injury and its impact; Discussed relationship with God;Explored/Affirmed feelings, thoughts, concerns;Prayer (assurance of)/[place]   Outcome Comfort;Engaged in conversation;Expressed feelings, needs, and concerns;Expressed feelings of [place], Peace and/or Love;Expressed Gratitude;Receptive   PT's son, William Ochoa was found to be at bedside. After introductions, he explained the difficult position that his mother is in clinically and that \"I do not want her to suffer\" He also mentioned a few times the need to be strong for the rest of the family but as we explored his own feelings, he broke down with an emotional release. I provided comfort and affirmation. He explained that there is more family arriving and that there is a family  as well. PT and family are Moravian/Jew. He was thankful for the time shared.   I will continue to follow,  Staff Philip Pritchett MA

## 2022-08-22 NOTE — PROGRESS NOTES
Internal medicine progress note       Code:DNR-CCA  Admit Date: 8/20/2022    PCP: No primary care provider on file. Reason for admission: Loss of Consciousness (Pt via squad for \"unresponsive\". Squad reports pt was \"found on the couch by sister, barely breathing, SPO2 70% on RA upon arrival.  Pt unresponsive upon arrival)    Initial HISTORY OF PRESENT ILLNESS:   Bud Enriquez is a 78 y.o. female transferred via air care to [facility] from [address] ED where she presented to the ED brought in by EMS, called for unresponsive patient since 4 AM in the morning. History, obtained from her sister Mackenzie Wakefield via phone. Sister reports that patient has been complaining of SOB over the last week or 2, she became more short of breath through the night and she sat down on the couch, and became slowly less responsive, breathing shallow and rapidly with profused sweating. EMS was called and she was brought to [address] ED. 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 She had been stressing a lot about the medical condition his son, lately. In [address] ED she was unresponsiveness, white foamy discharge coming from her mouth, she was hypoxic around 70% with respiratory distress, tachypneic in the 30s on arrival, she was promptly intubated and work-up initiated  -- Labs: CMP unremarkable other than blood glucose of 253. CBC WBC 13, hemoglobin 15.8, proBNP 23,393.   Trop 0.07,   -- EKG noted a STEMI  -- CT Head showed large intraparenchymal left-sided hemorrhage (aspirin, Brilinta, heparin and these were promptly canceled)  -- She was HTN: Nicardipine and Keppra given  -- She was given 1 L bolus fluids,  chest x-ray: concerns for pulmonary edema  -- Purulent drainage from the right eye, this was cleaned and antibiotic drops placed, I discussed all this with Dr. Luis Novoa hospitalist at [facility], she agreed to accept for admission, I later called Dr. Estela Cabrera cardiology service at [facility] to update him on the patient who is being transferred there, her troponin did improve from 0.07 to 0.05, I made some vent adjustments and her blood gases notably improved. -- Dr. Kathryn Stuart, he recommended transfer to [facility] ICU and Dr. Estela Cabrera cardiology have been updated  --she was transported via air care to [facility]. Interval history. Patient was seen and examined at bedside. Yesterday, unfortunately patient arrested and was in V. fib cardiac arrest, ROSC achieved after ACLS protocol. Now also noted to have right lower extremity ischemia. Remained in critical condition, intubated and on mechanical ventilation. Unresponsive. MEDICATIONS:     No current facility-administered medications on file prior to encounter. Current Outpatient Medications on File Prior to Encounter   Medication Sig Dispense Refill    gabapentin (NEURONTIN) 100 MG capsule Take 1 capsule by mouth 3 times daily for 30 days.  Intended supply: 30 days 90 capsule 0         Scheduled Meds:   ipratropium-albuterol  1 ampule Inhalation TID    sodium chloride flush  5-40 mL IntraVENous 2 times per day    sodium chloride flush  5-40 mL IntraVENous 2 times per day    insulin lispro  0-4 Units SubCUTAneous Q6H    metoprolol  2.5 mg IntraVENous Q4H    atorvastatin  40 mg Orogastric Nightly    levETIRAcetam  1,000 mg IntraVENous Q12H      Continuous Infusions:   sodium chloride Stopped (08/22/22 1122)    dexmedetomidine (PRECEDEX) IV infusion Stopped (08/22/22 0845)    sodium chloride      sodium chloride Stopped (08/21/22 2224)    niCARdipine 10 mg/hr (08/22/22 1231)    dextrose       PRN Meds:sodium chloride flush, sodium chloride, morphine, sodium chloride flush, sodium chloride, ondansetron **OR** ondansetron, polyethylene glycol, acetaminophen **OR** acetaminophen, glucose, dextrose bolus **OR** dextrose bolus, glucagon (rDNA), dextrose    Allergies: No Known Allergies    REVIEW OF SYSTEMS:       unobtainable from patient due to mental status      PHYSICAL EXAM:       Vitals: BP (!) 148/73   Pulse (!) 111   Temp 99.5 °F (37.5 °C) (Axillary)   Resp 18   Wt 150 lb 5.7 oz (68.2 kg)   SpO2 97%   BMI 29.61 kg/m²     I/O:    Intake/Output Summary (Last 24 hours) at 8/22/2022 1431  Last data filed at 8/22/2022 1000  Gross per 24 hour   Intake 2027.62 ml   Output 370 ml   Net 1657.62 ml       I/O this shift:  In: -   Out: 20 [Urine:20]  I/O last 3 completed shifts: In: 2941 [I.V.:621.8; NG/GT:60; IV Piggyback:2259.3]  Out: 1320 [Urine:1320]    Physical Examination:     Physical Exam  Constitutional:       Comments: Unresponsive. HENT:      Head: Normocephalic and atraumatic. Cardiovascular:      Rate and Rhythm: Normal rate and regular rhythm. Pulses: Normal pulses. Heart sounds: Normal heart sounds. Pulmonary:      Comments: Intubated and on mechanical ventilation. Abdominal:      General: Abdomen is flat. Bowel sounds are normal.      Palpations: Abdomen is soft. Skin:     Comments: Cold right lower extremity, mottled skin.    Neurological:      Comments: Unresponsive       Access:   -Central Access Day #:  None                                   -Peripheral Access Day#:1  -Arterial line Day#:None                                Combs Day#:1  NGT Day#: 1                                            ETT Day#:1  Vent Settings: Vent Mode: AC/PRVC Resp Rate (Set): 18 bmp/Vt (Set, mL): 400 mL/ /FiO2 : 35 %    Recent Labs     08/21/22  0930 08/21/22  1645   PHART 7.424 7.350   JQK0MLZ 42.0 44.2   PO2ART 73.9* 114.0*             DATA: Labs:  CBC:   Recent Labs     08/21/22  0525 08/21/22  1645 08/22/22  0344   WBC 23.5* 13.9* 21.7*   HGB 13.3 13.5 12.4   HCT 42.3 44.1 39.1    192 148         BMP:   Recent Labs     08/21/22  1645 08/21/22  1924 08/22/22  0344 08/22/22  0601 08/22/22  0800      < > 149*  150* 150* 152*   K 4.6  --  5.2*  --  5.7*     --  116*  --  118*   CO2 22  --  24  --  22   BUN 37*  --  58*  --  65*   CREATININE 1.3*  --  2.0*  --  2.1*   GLUCOSE 184*  --  147*  --  140*   PHOS 4.8  --  7.1*  --  7.4*    < > = values in this interval not displayed. LFT's:   Recent Labs     08/20/22  0447 08/21/22  1645 08/22/22  0648   AST 27 142* 283*   ALT 41* 103* 147*   BILITOT 0.7 0.6 0.7   ALKPHOS 80 79 55       Troponin:   Recent Labs     08/20/22  0649 08/20/22  0930 08/20/22  1340   TROPONINI 0.05* 0.54* 1.08*       BNP:No results for input(s): BNP in the last 72 hours. ABGs:   Recent Labs     08/21/22  0930 08/21/22  1645   PHART 7.424 7.350   [id number] 42.0 44.2   PO2ART 73.9* 114.0*       INR:   Recent Labs     08/20/22  0447 08/20/22  0930 08/22/22  0648   INR 0.98 0.98 1.37*         U/A:  Recent Labs     08/20/22  0640   COLORU Yellow   PHUR 6.5  6.5   WBCUA 3-5   RBCUA 5-10*   BACTERIA Rare*   CLARITYU Clear   SPECGRAV >=1.030   LEUKOCYTESUR Negative   UROBILINOGEN 0.2   BILIRUBINUR Negative   BLOODU SMALL*   GLUCOSEU 250*   AMORPHOUS Rare         CT HEAD WO CONTRAST   Final Result      1. Left basal ganglia intracerebral hemorrhage, not significantly changed. XR CHEST PORTABLE   Final Result      Right basilar airspace disease and pleural effusion      CTA HEAD W WO CONTRAST   Final Result      1. Evolving large parenchymal hematoma centered in the left basal ganglia with interval slight increase in size. Slight worsening of adjacent vasogenic edema and mass effect with subfalcine herniation and 6 mm midline shift to right.    2. No evidence of intracranial aneurysm, AVM or large vessel occlusion. XR ABDOMEN (KUB) (SINGLE AP VIEW)   Final Result      1. Nasogastric tube tip mid stomach. VL DUP LOWER EXTREMITY ARTERIES BILATERAL    (Results Pending)       EKG: STEMI  Echo: Not Done  Micro: Not Done    ASSESSMENT AND PLAN:   Annmarie Yang is a 78 y.o. female with unremarkable PMHx, presented with LOS, has been SOB for two weeks, CT showed IPH and STEMI on EKG. #Intraparenchymal left-sided hemorrhage (Altered Mental Status)  Patient presented with loss of consciousness to the ED, CT head showed intraparenchymal hemorrhage. She was hypertensive to 414 systolic blood pressure on presentation  -Nicardipine drip  -Kepra 1 g BID  -Maintain blood pressure less than 160  -Vital monitoring  -neurology and Neurosurgery services following.  -Neuro check Q 1 Hr  -Decadron for swelling  -Repeat CT wo contrast on August 20, 2022 showed worsening bleeding with vasogenic edema. Patient received 25 g of mannitol twice on 8/20th.  -Patient clinical condition is worsening, she is unresponsive. #STEMI  Presented with loss of consciousness, and labored breathing. EKG in the ED showed anterolateral wall STEMI. -Cardiology consult and following.  -Continue atorvastatin, Metoprolol 2.5 Qh4  -Unable to use anticoagulation or antiplatelet due to intracranial hemorrhage.  -Continuous telemetry  -Echo done    #Acute Respiratory Failure with Hypoxia and Hypercapnia  Patient had SOB, productive cough, recent STEMI which all can cause Respiratory failure. Intubated: Intensivist managing. #Pulmonary Edema  Pulmonary edema on CXR likely secondary to STEMI  -Monitor I's and O's    # B/L lower extremities limb ischemia. -Vascular surgery service consulted. Unable to provide anticoagulation given intracranial bleed. Code Status:DNR-CCA  FEN: NPO  PPX:  SCD. Not anticoagulated due to the fact that she has ICH  DISPO: Remains critically ill, in the intensive care unit. Poor prognosis.     High risk for cardiac and neurological compromise due to acute intracranial bleed as well as acute ST elevation MI.     James Degroot MD  8/22/2022  2:31 PM

## 2022-08-22 NOTE — PROGRESS NOTES
NEUROSURGERY PROGRESS NOTE    8/22/2022 1:58 PM                               Rosales Chappell                      LOS: 2 days               Subjective: Patient laying in bed upon entering the room. Sedation turned off this morning. Vascular surgery was consulted this morning for change in lower extremity pulses with accompanied temperature and color changes to bilateral LE, right worse than left. Operative intervention in the setting of intraparenchymal hemorrhage with shift as well as active MI would likely only worsen morbidity of current process. Inability to heparanize during intervention would lead to high risk of failure. Palliative Care following and working with family to discuss goals of care and code status.     Physical Exam:  Patient seen and examined    Vitals:    08/22/22 1200   BP: (!) 148/73   Pulse: (!) 111   Resp:    Temp: 99.5 °F (37.5 °C)   SpO2: 97%     GCS:  1 - Does not open eyes  T - ETT in place  5 - Pushes away noxious stimulus  General: Encephalopathic   HENT: ETT in place  Eyes: Optic discs: Not tested  Pulmonary: Ventilator Assist  Cardiovascular: BUE Warm well perfused; RLE cold and mottled and LLE warm but no pulse in foot  Gastrointestinal: abdomen soft, NT, ND    Neurological:  Mental Status: Encephalopathic  Attention: NASREEN 2/2 encephalopathy  Language: NASREEN 2/2 ETT in place  Sensation: Grimace to noxious tactile in LUE only  Coordination: NASREEN 2/2 encephalopathy    Cranial Nerves:  II: No blink to threat on left; Right- false eye  III, IV, VI: PERRL, 3 mm in left, EOM exam limited 2/2 encephalopathy; Right Pupil fixed - false eye  V: Facial sensation exam limited 2/2 encephalopathy  VII: Face symmetric  VIII: Hearing exam limited 2/2 encephalopathy  IX: Palate movement exam limited 2/2 ETT in place  XI: Shoulder shrug exam limited 2/2 encephalopathy  XII: Tongue midline exam limited 2/2 ETT in place    Musculoskeletal:   Gait: Not tested   Assist devices: None   Tone: Normal on left and flaccid on right  Motor strength:   RUE- No movement to noxious tactile stimuli  LUE- Purposeful movement (grabbing fo ETT)  RLE- No movement to noxious tactile stimuli  LLE- No movement to noxious tactile stimuli    Radiological Findings:  CT HEAD WO CONTRAST  Result Date: 8/20/2022  Acute intraparenchymal hemorrhage seen centered within the left basal ganglia suggestive of a hypertensive hemorrhage, measuring 3.0 x 6.1 x 3.9 cm in size with associated mild adjacent vasogenic edema, mass effect and minimal left-to-right midline shift measuring 4 mm. This was discussed with the ordering provider Keily Harrison at 5:40 a.m. on 08/20/2022. CTA HEAD W WO CONTRAST  Result Date: 8/20/2022  1. Evolving large parenchymal hematoma centered in the left basal ganglia with interval slight increase in size. Slight worsening of adjacent vasogenic edema and mass effect with subfalcine herniation and 6 mm midline shift to right. 2. No evidence of intracranial aneurysm, AVM or large vessel occlusion. CT HEAD WO CONTRAST  Result Date: 8/21/2022  Left basal ganglia intracerebral hemorrhage, not significantly changed. Labs:  Recent Labs     08/22/22  0344   WBC 21.7*   HGB 12.4   HCT 39.1          Recent Labs     08/22/22  0800   *   K 5.7*   *   CO2 22   BUN 65*   CREATININE 2.1*   GLUCOSE 140*   CALCIUM 7.8*   PHOS 7.4*   MG 2.90*       Recent Labs     08/20/22  0447 08/20/22  0930 08/22/22  0648   PROTIME 12.9   < > 16.8*   INR 0.98   < > 1.37*   APTT 24.9  --   --     < > = values in this interval not displayed.        Patient Active Problem List    Diagnosis Date Noted    Nontraumatic subcortical hemorrhage of left cerebral hemisphere ([address] 08/21/2022    Acute respiratory failure with hypoxia ([address] 08/21/2022    STEMI (ST elevation myocardial infarction) ([facility] 08/20/2022    Acute cerebrovascular accident (CVA) of basal ganglia ([facility] 08/20/2022       Assessment:  Patient is a 78 y.o. female w/AMS found to have large left BG ICH and STEMI. Plan:  Neurologic exams frequency: Defer to Neuro Critical Care Team  For change in exam MUST contact neurosurgery team along with critical care or primary team  ICH:  - Repeat CT Head stable  - Maintain SBP <160; If PRN med insufficient, then may start Nicardipine infusion  - Hold all anticoagulation & antiplatelet for 2 weeks  - Keep Plt >100k & INR <1.4  Cerebral edema:  - Keep HOB >30 degrees  - 3% sodium chloride infusion - per Neuro Critical Care  - NO dextrose in IVF's or in IV drips  - NO Decadron  - If central venous access is needed please use subclavian vs femoral - No IJ as this can decrease venous return and worsen cerebral edema  DVT Prophylaxis: SCD's  Mobility: PT/OT as tolerates  Diet: Advance as tolerates  Appreciate critical care team assistance in management  Will follow peripherally while inpatient. Please call with any questions or decline in neurological status    DISPO: Dispo timing to be determined by primary team once patient is medically stable for discharge. Patient was discussed and images reviewed with Dr. Kathryn Ho who agrees with above assessment and plan. Electronically signed by: JOE Santa - JJ, APRN-CNP, 8/22/2022 1:58 PM  [phone]      Critical Care:  Due to the immediate potential for life-threatening deterioration due to neurological failure, I spent 30 minutes providing critical care. This time excludes time spent performing procedures but includes time spent on direct patient care, history retrieval, review of the chart, and discussions with patient, family, and consultant(s).

## 2022-08-22 NOTE — PROGRESS NOTES
Had a conversation with patient's oldest son William Ochoa regarding code status. He has stated that he would like us to continue everything we are doing at this time however if she were to arrest again, he would not want her to be resuscitated again. Code status has been changed to [facility].

## 2022-08-22 NOTE — PROGRESS NOTES
Pt. No longer responsive to painful stimuli. Sedation turned off at this time. Similar scenario occurred over night. Stat CT ordered over night due to change in neuro status, no change noted on scan.

## 2022-08-22 NOTE — PLAN OF CARE
Problem: Discharge Planning  Goal: Discharge to home or other facility with appropriate resources  Outcome: Progressing     Problem: Chronic Conditions and Co-morbidities  Goal: Patient's chronic conditions and co-morbidity symptoms are monitored and maintained or improved  Outcome: Progressing     Problem: Safety - Medical Restraint  Goal: Remains free of injury from restraints (Restraint for Interference with Medical Device)  Description: INTERVENTIONS:  1. Determine that other, less restrictive measures have been tried or would not be effective before applying the restraint  2. Evaluate the patient's condition at the time of restraint application  3. Inform patient/family regarding the reason for restraint  4.  Q2H: Monitor safety, psychosocial status, comfort, nutrition and hydration  Outcome: Progressing  Flowsheets  Taken 8/22/2022 0200  Remains free of injury from restraints (restraint for interference with medical device):   Determine that other, less restrictive measures have been tried or would not be effective before applying the restraint   Evaluate the patient's condition at the time of restraint application   Inform patient/family regarding the reason for restraint   Every 2 hours: Monitor safety, psychosocial status, comfort, nutrition and hydration  Taken 8/22/2022 0000  Remains free of injury from restraints (restraint for interference with medical device):   Determine that other, less restrictive measures have been tried or would not be effective before applying the restraint   Evaluate the patient's condition at the time of restraint application   Inform patient/family regarding the reason for restraint   Every 2 hours: Monitor safety, psychosocial status, comfort, nutrition and hydration  Taken 8/21/2022 2200  Remains free of injury from restraints (restraint for interference with medical device):   Determine that other, less restrictive measures have been tried or would not be effective before applying the restraint   Inform patient/family regarding the reason for restraint   Evaluate the patient's condition at the time of restraint application   Every 2 hours: Monitor safety, psychosocial status, comfort, nutrition and hydration  Taken 8/21/2022 2000  Remains free of injury from restraints (restraint for interference with medical device):   Determine that other, less restrictive measures have been tried or would not be effective before applying the restraint   Evaluate the patient's condition at the time of restraint application   Inform patient/family regarding the reason for restraint   Every 2 hours: Monitor safety, psychosocial status, comfort, nutrition and hydration     Problem: Pain  Goal: Verbalizes/displays adequate comfort level or baseline comfort level  Outcome: Progressing     Problem: Skin/Tissue Integrity  Goal: Absence of new skin breakdown  Description: 1. Monitor for areas of redness and/or skin breakdown  2. Assess vascular access sites hourly  3. Every 4-6 hours minimum:  Change oxygen saturation probe site  4. Every 4-6 hours:  If on nasal continuous positive airway pressure, respiratory therapy assess nares and determine need for appliance change or resting period.   Outcome: Progressing     Problem: Safety - Adult  Goal: Free from fall injury  Outcome: Progressing

## 2022-08-22 NOTE — PROGRESS NOTES
Clinical Pharmacy Progress Note    All IVs in NS - Management by Pharmacy    Consult Date(s): 8/20  Consulting Provider(s): Dr Claudette Lam / Jonah Simpson Left [facility] in setting of STEMI - All IVs in Normal Saline  Patient started Hypertonic 3% on 8/21; continuing today. Drips will be adjusted to normal saline as appropriate based on compatibility, in an effort to avoid fluid shifts, as D5W is osmotically active. The following intermittent IV drips / infusions have been adjusted to saline:  Dexmedetomidine gtt  Levetiracetam   Nicardipine gtt  The following medications must remain in D5W due to incompatibility with normal saline:  None at this time  Note: Patient has dextrose ordered as part of hypoglycemia treatment protocol. Total IV fluid delivered to patient over last 24 hrs: ~2140 mL  Pharmacist will follow daily to ensure all IVPBs / drips are in NS where possible. Thank you for consulting Pharmacy!     David Kruse PharmD., BCPS   8/22/2022 10:22 AM  Wireless: [phone]

## 2022-08-22 NOTE — PROGRESS NOTES
Lower extremities with no pulses using doppler. R. Leg mottled, pale, and cold. L. Leg slightly warmer but pale and cold. Legs wrapped in warm blankets at this time.

## 2022-08-22 NOTE — CONSULTS
VascularSurgery   Resident Consult Note      Chief Complaint: Cold RLE     History obtained from: Chart, RN    History of Present Illness :   Bud Enriquez is a 78 y.o. female who was transferred from [address] on 08/20, where she was taken by EMS after being found unresponsive by her sister. At [place] [id number]. Orab workup revealed anterolateral STEMI and intra-parenchymal left-sided hemorrhage. She was transferred to [facility] ICU for neurosurgical evaluation. She has remained in the ICU, intubated. She suffered cardiac arrest yesterday around 1600, and ROSC was obtained after 2 rounds of ACLS. Repeat head CT performed overnight for worsening mental status. Vascular surgery was consulted this morning for change in lower extremity pulses with accompanied temperature and color changes to bilateral LE, right worse than left. RN reports ability to doppler bilateral DP/PT at 0345. She noted worsening mottling to right leg along with more pale appearance to the right leg, and it was noted to be cold. She was then unable to doppler any distal pulses. The patient remains on a precedex drip without purposeful movement when she is weaned from sedation. Her right side has been flaccid since presentation. Past Medical History:    No past medical history on file. Past Surgical History:        Procedure Laterality Date    EYE SURGERY      HYSTERECTOMY (CERVIX STATUS UNKNOWN)      NECK SURGERY         Allergies:   Patient has no known allergies. Medications:   Home Meds  No current facility-administered medications on file prior to encounter. Current Outpatient Medications on File Prior to Encounter   Medication Sig Dispense Refill    gabapentin (NEURONTIN) 100 MG capsule Take 1 capsule by mouth 3 times daily for 30 days.  Intended supply: 30 days 90 capsule 0       Current Meds  dexmedetomidine (PRECEDEX) 400 mcg in sodium chloride 0.9 % 100 mL infusion, Continuous  sodium chloride 3 % solution, Continuous  lidocaine PF 1 % injection 5 mL, Once  sodium chloride flush 0.9 % injection 5-40 mL, 2 times per day  sodium chloride flush 0.9 % injection 5-40 mL, PRN  0.9 % sodium chloride infusion, PRN  morphine (PF) injection 2 mg, Q4H PRN  sodium chloride flush 0.9 % injection 5-40 mL, 2 times per day  sodium chloride flush 0.9 % injection 5-40 mL, PRN  0.9 % sodium chloride infusion, PRN  ondansetron (ZOFRAN-ODT) disintegrating tablet 4 mg, Q8H PRN   Or  ondansetron (ZOFRAN) injection 4 mg, Q6H PRN  polyethylene glycol (GLYCOLAX) packet 17 g, Daily PRN  acetaminophen (TYLENOL) tablet 650 mg, Q6H PRN   Or  acetaminophen (TYLENOL) suppository 650 mg, Q6H PRN  niCARdipine (CARDENE) 25 mg in sodium chloride 0.9 % 250 mL infusion, Continuous  glucose chewable tablet 16 g, PRN  dextrose bolus 10% 125 mL, PRN   Or  dextrose bolus 10% 250 mL, PRN  glucagon (rDNA) injection 1 mg, PRN  dextrose 10 % infusion, Continuous PRN  insulin lispro (1 Unit Dial) 0-4 Units, Q6H  metoprolol (LOPRESSOR) injection 2.5 mg, Q4H  atorvastatin (LIPITOR) tablet 40 mg, Nightly  dexamethasone (DECADRON) injection 4 mg, Q6H  levETIRAcetam (KEPPRA) 1,000 mg in sodium chloride 0.9 % 100 mL IVPB, Q12H  ipratropium-albuterol (DUONEB) nebulizer solution 1 ampule, 4x daily  perflutren lipid microspheres (DEFINITY) injection 1.65 mg, ONCE PRN        Family History:   No family history on file. Social History:   TOBACCO:   reports that she has been smoking cigarettes. She has been smoking an average of 1.5 packs per day. She has never used smokeless tobacco.  ETOH:   reports no history of alcohol use. DRUGS:   has no history on file for drug use.     Review of Systems:        Unable to obtain    Physical exam:   Vitals:    08/22/22 0600 08/22/22 0615 08/22/22 0630 08/22/22 0645   BP: (!) 158/78 (!) 148/72 (!) 156/86 (!) 155/78   Pulse: 90 83 82 84   Resp:       Temp:       TempSrc:       SpO2: 99% 98% 99% 99%   Weight:           General appearance: ill-appearing, intubated, sedated  HEENT: ET tube in place  Neck: trachea midline, no JVD  Chest/Lungs: mechanical breath sounds  Cardiovascular: RRR  Abdomen: obese, soft, non-tender, non-distended, no pulsatile masses  Skin: RLE with mottling to thigh as well as some in distal calf, lower extremities cool to touch, right worse than left, distal feet pale  Extremities: no edema,   Neuro: A&Ox3, no focal deficits, sensation intact    Pulses    Fem Pop PT DP   Right -/+ -/+ - -   Left + -/+ - -       Labs:    CBC:   Recent Labs     08/21/22  0525 08/21/22  1645 08/22/22  0344   WBC 23.5* 13.9* 21.7*   HGB 13.3 13.5 12.4   HCT 42.3 44.1 39.1   MCV 77.0* 78.8* 77.6*    192 148     BMP:   Recent Labs     08/21/22  0524 08/21/22  1253 08/21/22  1645 08/21/22  1924 08/22/22  0153 08/22/22  0344 08/22/22  0601      < > 145   < > 150* 149*  150* 150*   K 4.2  --  4.6  --   --  5.2*  --      --  108  --   --  116*  --    CO2 24  --  22  --   --  24  --    PHOS 3.8  --  4.8  --   --  7.1*  --    BUN 26*  --  37*  --   --  58*  --    CREATININE 1.0  --  1.3*  --   --  2.0*  --     < > = values in this interval not displayed.      PT/INR:   Recent Labs     08/20/22  0447 08/20/22  0930   PROTIME 12.9 12.8   INR 0.98 0.98     APTT:   Recent Labs     08/20/22 0447   APTT 24.9     Liver Profile:   Lab Results   Component Value Date/Time     08/21/2022 04:45 PM     08/21/2022 04:45 PM    BILIDIR <0.2 08/21/2022 04:45 PM    BILITOT 0.6 08/21/2022 04:45 PM    ALKPHOS 79 08/21/2022 04:45 PM   No results found for: CHOL, HDL, TRIG  UA:   Lab Results   Component Value Date/Time    COLORU Yellow 08/20/2022 06:40 AM    PHUR 6.5 08/20/2022 06:40 AM    PHUR 6.5 08/20/2022 06:40 AM    WBCUA 3-5 08/20/2022 06:40 AM    RBCUA 5-10 08/20/2022 06:40 AM    BACTERIA Rare 08/20/2022 06:40 AM    CLARITYU Clear 08/20/2022 06:40 AM    SPECGRAV >=1.030 08/20/2022 06:40 AM    LEUKOCYTESUR Negative 08/20/2022 06:40 AM    UROBILINOGEN 0.2 08/20/2022 06:40 AM    BILIRUBINUR Negative 08/20/2022 06:40 AM    BLOODU SMALL 08/20/2022 06:40 AM    GLUCOSEU 250 08/20/2022 06:40 AM    AMORPHOUS Rare 08/20/2022 06:40 AM       Imaging  EXAM: CT HEAD WO CONTRAST       INDICATION: Worsening neurological status, r/o worsening brain bleed;       COMPARISON: August 20       TECHNICAL: Axial CT imaging obtained from vertex to skull base. Axial images and multiplanar reformatted images reviewed. Individualized dose optimization technique was used in order to meet ALARA standards for radiation dose reduction. In addition to    vendor specific dose reduction algorithms, the dose reduction techniques vary based on the specific scanner utilized but frequently include automated exposure control, adjustment of the mA and/or kV according to patient size, and use of iterative    reconstruction technique. IV Contrast: None. FINDINGS:       Large intracerebral left-sided hemorrhage is again demonstrated. Measuring similarly to prior study, is approximately 3.2 x 7.1 cm, unchanged. Surrounding vasogenic edema. No significant new intracranial hemorrhage. Mass effect is present with effacement    of the left lateral ventricle and right to left midline shift of approximately 8 mm, unchanged. Impression       1. Left basal ganglia intracerebral hemorrhage, not significantly changed. Assessment/Plan: This is a 78 y.o. female with acute intraparenchymal hemorrhage and STEMI, currently intubated without significant neurological function when sedation is weaned. Vascular surgery has been consulted with concern for acute lower extremity ischemia.     -Bilateral lower extremities with signs of acute limb ischemia, right worse than left  - given inability to anticoagulate, limited options for meaningful intervention from a vascular surgery standpoint  - operative intervention in the setting of intraparenchymal hemorrhage with shift as well as active MI would likely only worsen morbidity of current process. Inability to heparanize during intervention would lead to high risk of failure.    - recommend continued discussion with family regarding goals of care   - Q1H neurovascular checks  - will return to re-evaluate and talk to family    Discussed with Dr. Alexandrea Cox DO  08/22/22  7:16 AM

## 2022-08-22 NOTE — PROGRESS NOTES
ICU Progress Note    Admit Date: 8/20/2022  Day: 2  Vent Day: 2  IV Access:Peripheral  IV Fluids:None  Vasopressors:None                Antibiotics: None  Diet: Diet NPO    CC: Loss of Consciousness (Pt via squad for \"unresponsive\". Squad reports pt was \"found on the couch by sister, barely breathing, SPO2 70% on RA upon arrival.  Pt unresponsive upon arrival)    Interval history: Patient seen and examined at bedside. Patient had code blue called yesterday at 1622. Patient was noted to be in V fib on first pulse check and was given shock and obtained ROSC. Overnight, patient had an acute change in mental status where she became unresponsive to painful stimuli at 2200. Precedex was stopped and stat CT head was obtained which showed no change from previous CT. At around 0000, patient began to withdraw from pain again and Precedex was restarted. At around [address], patient's right foot began to look more pale than on previous assessments and pulses were unable to be obtained with doppler when they had been identified earlier this morning. R foot was cold and vascular surgery was consulted. This morning, again patient is no longer responsive to painful stimuli and Precedex drip was again stopped. Both lower extremities now without pulses, R leg mottled, pale, cold, worse than left leg. Legs were wrapped in warm blankets. On exam, patient is not responding to verbal or painful stimuli. She is currently intubated, off sedation. Unable to obtain ROS. HPI: Rosie Luciano is a 78 y.o. female transferred via air care to [facility]. from [address] ED where she presented to the ED, brought in by EMS, called for unresponsive patient since 4 AM in the morning. History, obtained from her sister Rikki Pereira via phone.   Sister reports that patient has been complaining of SOB over the last week or 2, she became more short of breath through the night and she sat down on the couch, and became slowly less responsive, breathing shallow and rapidly with profused sweating. EMS was called and she was brought to [facility] ED. 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 She had been stressing a lot about the medical condition his son, lately. In [facility] ED she was unresponsiveness, white foamy discharge coming from her mouth, she was hypoxic around 70% with respiratory distress, tachypneic in the 30s on arrival, she was promptly intubated and work-up initiated  -- Labs: CMP unremarkable other than blood glucose of 253. CBC WBC 13, hemoglobin 15.8, proBNP 23,393. Trop 0.07,   -- EKG noted a STEMI  -- CT Head showed large intraparenchymal left-sided hemorrhage (aspirin, Brilinta, heparin and these were promptly canceled)  -- She was HTN: Nicardipine and Keppra given  -- She was given 1 L bolus fluids,  chest x-ray: concerns for pulmonary edema  -- Purulent drainage from the right eye, this was cleaned and antibiotic drops placed, I discussed all this with Dr. Boogie Gaston hospitalist at [facility], she agreed to accept for admission, I later called Dr. Jeri Holder cardiology service at [facility] to update him on the patient who is being transferred there, her troponin did improve from 0.07 to 0.05, I made some vent adjustments and her blood gases notably improved. -- Dr. Ambrose Benjamin, he recommended transfer to [facility] ICU and Dr. Jeri Holder cardiology have been updated  --she was transported via air care to [facility].     Medications:     Scheduled Meds:   ipratropium-albuterol  1 ampule Inhalation TID    sodium chloride flush  5-40 mL IntraVENous 2 times per day Reason unable to perform ROS: Intubated, unresponsive off sedation. Physical Exam  HENT:      Head: Normocephalic and atraumatic. Cardiovascular:      Rate and Rhythm: Normal rate and regular rhythm. Heart sounds: No murmur heard. Pulmonary:      Breath sounds: Normal breath sounds. Comments: Intubated  Abdominal:      General: Abdomen is flat. Bowel sounds are normal.      Palpations: Abdomen is soft. Musculoskeletal:      Comments: R leg mottled, bilateral lower extremities pale, cold   Skin:     General: Skin is dry. Comments: Bilateral lower extremities pale   Neurological:      Comments: Unresponsive to verbal and painful stimuli, sedation stopped this morning         LABS:    CBC:   Recent Labs     08/21/22  0525 08/21/22  1645 08/22/22  0344   WBC 23.5* 13.9* 21.7*   HGB 13.3 13.5 12.4   HCT 42.3 44.1 39.1    192 148   MCV 77.0* 78.8* 77.6*     Renal:    Recent Labs     08/20/22  1340 08/21/22  0524 08/21/22  1645 08/21/22  1924 08/22/22  0344 08/22/22  0601 08/22/22  0800   NA  --    < > 145   < > 149*  150* 150* 152*   K  --    < > 4.6  --  5.2*  --  5.7*   CL  --    < > 108  --  116*  --  118*   CO2  --    < > 22  --  24  --  22   BUN  --    < > 37*  --  58*  --  65*   CREATININE  --    < > 1.3*  --  2.0*  --  2.1*   GLUCOSE  --    < > 184*  --  147*  --  140*   CALCIUM  --    < > 8.8  --  8.2*  --  7.8*   MG 1.40*  --  3.30*  --   --   --  2.90*   PHOS  --    < > 4.8  --  7.1*  --  7.4*   ANIONGAP  --    < > 15  --  10  --  12    < > = values in this interval not displayed.      Hepatic:   Recent Labs     08/20/22  0447 08/21/22  0524 08/21/22  1645 08/22/22  0344 08/22/22  0648 08/22/22  0800   AST 27  --  142*  --  283*  --    ALT 41*  --  103*  --  147*  --    BILITOT 0.7  --  0.6  --  0.7  --    BILIDIR  --   --  <0.2  --  <0.2  --    PROT 7.8  --  6.2*  --  5.8*  --    LABALBU 4.3   < > 3.2*  3.4 3.0* 3.1* 3.0*   ALKPHOS 80  --  79  --  55  --     < > = values in this interval not displayed. Troponin:   Recent Labs     08/20/22  0649 08/20/22  0930 08/20/22  1340   TROPONINI 0.05* 0.54* 1.08*     BNP: No results for input(s): BNP in the last 72 hours. Lipids: No results for input(s): CHOL, HDL in the last 72 hours. Invalid input(s): LDLCALCU, TRIGLYCERIDE  ABGs:    Recent Labs     08/20/22  0930 08/21/22  0930 08/21/22  1645   PHART 7.307* 7.424 7.350   EWJ3AAM 60.1* 42.0 44.2   PO2ART 85.7 73.9* 114.0*   KVL6CAD 30* 28 24   BEART 1.8 2.6 -1.4   X3VPYNOJ 95 95 98   XER7OLZ 32 29 26       INR:   Recent Labs     08/20/22  0447 08/20/22  0930 08/22/22  0648   INR 0.98 0.98 1.37*     Lactate: No results for input(s): LACTATE in the last 72 hours. Cultures:  -----------------------------------------------------------------  RAD:   CT HEAD WO CONTRAST   Final Result      1. Left basal ganglia intracerebral hemorrhage, not significantly changed. XR CHEST PORTABLE   Final Result      Right basilar airspace disease and pleural effusion      CTA HEAD W WO CONTRAST   Final Result      1. Evolving large parenchymal hematoma centered in the left basal ganglia with interval slight increase in size. Slight worsening of adjacent vasogenic edema and mass effect with subfalcine herniation and 6 mm midline shift to right. 2. No evidence of intracranial aneurysm, AVM or large vessel occlusion. XR ABDOMEN (KUB) (SINGLE AP VIEW)   Final Result      1. Nasogastric tube tip mid stomach. Assessment/Plan:   Erwin Duckworth is a 78 y.o. female with unremarkable PMHx, presented with LOS, has been SOB for two weeks, CT showed IPH and STEMI on EKG. #Intraparenchymal left-sided hemorrhage (Altered Mental Status)  Patient presented with loss of consciousness to the ED, CT head showed intraparenchymal hemorrhage. She was hypertensive to 296 systolic blood pressure on presentation.  Repeat CT 8/20 showed \"Evolving large parenchymal hematoma, subfalcine herniation and 6 mm midline shift to right\"     -Nicardipine drip currently at 10mg/hr  -Kepra 1 g BID  -Maintain blood pressure less than 160  -Vital monitoring  -Consult neurology: Started on 3% Saline  -Consult Neurosurgery: received Manitol x 2  -Neuro check Q 1 Hr  -Patient had acute change in mental status overnight where she stopped responding to painful stimuli. Repeat CT done overnight due to change in mental status showed no changes from previous. She did recover briefly overnight however she is again unresponsive to painful stimuli this morning. Precedex was stopped. -Palliative consult for goals of care and code status discussions        #Anterolateral STEMI  Presented with loss of consciousness, and labored breathing. EKG in the ED showed STEMI. Repeat EKG 8/20 improved STEMI. -Cardiology consult. Spoke with cardiology . They are ok with holding AP/AC given the patient has intracranial bleed.  -Atorvastatin   -Metoprolol 2.5 Qh4  -Continuous telemetry  -Echo: Pending     Acute limb ischemia  - Overnight, patient was noted to have lost pulses in her bilateral lower extremities  - R leg mottled, pale, cold; left leg slightly warmer but also pale and cold. R worse than L. Currently wrapped in warm blankets  - Vascular surgery consulted, appreciate recommendations  - Given inability to anticoagulate due to intraparenchymal hemorrhage, there is limited options for intervention per vascular surgery and recommend continued discussion with family regarding goals of care. #Acute Respiratory Failure with Hypoxia and Hypercapnia  Patient had SOB, productive cough, recent STEMI which all can cause Respiratory failure. -Duoneb  Intubated: SPO2 98%  Vent settings: RR 18, FiO2 40%, PEEP 8, TD volume 400        #Pulmonary Edema 2/2 CHF  Pulmonary edema on CXR likely secondary to STEMI  - Monitor ins and Outs.  Patient is +1400mL net fluid balance over entire visit  - Echo: Pending     #JORDI  - Cr 0.8 on admission, currently 2.1.    #Leuckocytosis  Most likely secondary to steroids. Patient is afebrile        Code Status:Full Code  FEN: NPO  PPX:  SCD. Not anticoagulated bcz she has [facility]  DISPO: From home to [facility], tranferred to [facility] ICU will be discharged to home? Roney Cornejo , PGY-2  08/22/22  10:52 AM    This patient has been staffed and discussed with Dr. Isamar Gonzalez. Patient seen, examined and discussed with the resident and I agree with the assessment and plan. Briefly, this is a 78 y.o. female with a large intraparenchymal hemorrhage, STEMI and arterial insufficiency     Vent Mode: AC/PRVC Resp Rate (Set): 18 bmp/Vt (Set, mL): 400 mL/ /FiO2 : 35 %  PEEP 5  Recent Labs     08/21/22  0930 08/21/22  1645   PHART 7.424 7.350   ECZ1GJE 42.0 44.2   PO2ART 73.9* 114.0*     Patient is critically ill with 3 life threatening diagnoses at once. Unfortunately the treatments for each are in opposition with each other. She has a STEMI and acute arterial occlusion of her lower extremities both of which would require endovascular interventions followed by anticoagulation and antiplatelet therapies. However she also has a large intraparenchymal brain hemorrhage that would expand and kill her if she were on anticoagulation or antiplatelet therapy. The STEMI has already killed her once, as she had a VFib arrest yesterday. Her mentation is poor, which could be from a number of causes the brain bleed and impending edema being the most likely. Patient's prognosis is poor and transitioning to comfort measures would be the best option for this patient. Plan is for a family meeting tomorrow. She remains a full code at the moment, despite her high risk of sudden cardiac death, again. Critical care time spent reviewing labs/films, examining patient, collaborating with other physicians but excluding procedures for life threatening organ failure is 48 minutes.       Chaz Lewis MD

## 2022-08-22 NOTE — PROGRESS NOTES
Pt R foot started to look more pale than earlier assessments. RN tried to find pedal pulse with doppler again at 0515 and was unable to find a pedal, PT, or popliteal pulse. These pulses were all identified at 0230- and R foot was cold but not as discolored as it is upon this recent assessment. ICU residents notified and at bedside.

## 2022-08-22 NOTE — RT PROTOCOL NOTE
revise RT Bronchodilator order(s) to two equivalent RT bronchodilator orders with one order with TID Frequency and one order with Frequency of every 4 hours PRN wheezing or increased work of breathing using Per Protocol order mode. 11-13 - enter or revise RT Bronchodilator order(s) to one equivalent RT bronchodilator order with QID Frequency and an Albuterol order with Frequency of every 4 hours PRN wheezing or increased work of breathing using Per Protocol order mode. Greater than 13 - enter or revise RT Bronchodilator order(s) to one equivalent RT bronchodilator order with every 4 hours Frequency and an Albuterol order with Frequency of every 2 hours PRN wheezing or increased work of breathing using Per Protocol order mode. RT to enter RT Home Evaluation for COPD & MDI Assessment order using Per Protocol order mode.     Electronically signed by Brandon Phillip RCP on 8/22/2022 at 9:34 AM

## 2022-08-23 LAB
ESTIMATED AVERAGE GLUCOSE: 125.5 MG/DL
HBA1C MFR BLD: 6 %

## 2022-08-23 PROCEDURE — 2700000000 HC OXYGEN THERAPY PER DAY

## 2022-08-23 PROCEDURE — 94761 N-INVAS EAR/PLS OXIMETRY MLT: CPT

## 2022-08-23 PROCEDURE — 94003 VENT MGMT INPAT SUBQ DAY: CPT

## 2022-08-23 NOTE — PROGRESS NOTES
Pt son Jing Sanchez was called. He states he does not currently have a  home picked out yet. He will revisit with family in the morning.

## 2022-08-23 NOTE — PROGRESS NOTES
Pt has new neuro status change. Pupil is non-reactive. ICU residents aware. Pt taken to and from STAT head CT. See results for the read. Pt BP is getting softer 61/37 (45). 500 mL bolus ordered and initiated. Pt scan is worse. Dr. Heather Talbert and Dr. Lunba Smith updated. ICU residents updated family. Will continue to monitor for other changes. All safety precautions in place per unit protocol.

## 2022-08-23 NOTE — PROGRESS NOTES
Pt with change in neurological examination and repeat CTH shows increased edema and loss of sulci despite stable bleed. She has now developed hemodynamic changes and overall clinical picture is suggestive of increasing ICP and impending/ongoing bran herniation. She was taken off 3% saline today, however most recent Na remains elevated at 151. Pt was appropriately made DNR-CCA by son during the day. Discussed with son regarding high likely chance of death overnight and given opportunity to present to bedside. He did reiterate not wanting to further escalate care and will present in the AM to withdraw care if patient survives overnight.

## 2022-08-23 NOTE — PROGRESS NOTES
Patient transported from ICU to [email or web address] along with RN. Transport vent used with no complications noted.     Placed back on Vent in 4508, Patient calm with RN in room

## 2022-08-23 NOTE — DISCHARGE INSTRUCTIONS
Extra Heart Failure sites:   https://[employer]/ --- this is American Heart Association interactive Healthier Living with Heart Failure guidebook. Please copy and paste link into search bar. Use your mouse to scroll through the pages. Lots and lots of info / tips    [address] [address] --- free smart phone enrique- (icon will look like a lopsided pink heart) --Use your phone to track sodium / fluid intake,2 symptoms, weight, etc.    [employer] - website-- [employer] is a dialysis company. All dialysis patients follow a renal diet which IS low sodium!! This website offers free seasonal cookbooks.   Each quarter, they will release 25-30 new recipes with a breakdown of calories, sodium, glucose, etc    [email or web address] -- more free recipes

## 2022-08-23 NOTE — DEATH NOTES
Death Pronouncement Note  Patient's Name: Angélica Zepeda   Patient's YOB: 1955  MRN Number: [id number]    Admitting Provider: Janet Guerrero MD  Attending Provider: Winifred Stockton MD    Patient was examined and the following were absent: Pulses, Blood Pressure, and Respiratory effort    I declared the patient dead on 08/23/2022 at 12:07 am    Preliminary Cause of Death: Brain bleed     Electronically signed by Mike Olivares MD on 8/23/22 at 12:13 AM EDT

## 2022-08-23 NOTE — DISCHARGE SUMMARY
INTERNAL MEDICINE DEPARTMENT AT [address] [place] [address]  DISCHARGE SUMMARY    Patient ID: Artem Mack                                             Discharge Date: 8/23/2022   Patient's PCP: No primary care provider on file. Discharge Physician: Alyssa Byers DO  Admit Date: 8/20/2022   Admitting Physician: Gt Yu MD    PROBLEMS DURING HOSPITALIZATION:  Present on Admission:   STEMI (ST elevation myocardial infarction) ([facility]   Acute cerebrovascular accident (CVA) of basal ganglia ([facility]   Nontraumatic subcortical hemorrhage of left cerebral hemisphere [facility])   Acute respiratory failure with hypoxia ([facility]   Cardiac arrest ([facility]   Ischemic leg      DISCHARGE DIAGNOSES:    HPI:  Artem Mack is a 78 y.o. female transferred via air care to [facility] from [facility] ED where she presented to the ED, brought in by EMS, called for unresponsive patient since 4 AM in the morning. History, obtained from her sister Franco Bergman via phone. Sister reports that patient had been complaining of SOB over the last week or 2, she became more short of breath through the night and she sat down on the couch, and became slowly less responsive, breathing shallow and rapidly with profused sweating. EMS was called and she was brought to [facility] ED. 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 She had been stressing a lot about the medical condition his son, lately. In [facility] ED she was unresponsiveness, white foamy discharge coming from her mouth, she was hypoxic around 70% with respiratory distress, tachypneic in the 30s on arrival, she was promptly intubated and work-up initiated  -- Labs: CMP unremarkable other than blood glucose of 253. CBC WBC 13, hemoglobin 15.8, proBNP 23,393. Trop 0.07,   -- EKG noted a STEMI  -- CT Head showed large intraparenchymal left-sided hemorrhage (aspirin, Brilinta, heparin and these were promptly canceled)  -- She was HTN: Nicardipine and Keppra given  -- She was given 1 L bolus fluids,  chest x-ray: concerns for pulmonary edema  -- Purulent drainage from the right eye, this was cleaned and antibiotic drops placed, I discussed all this with Dr. Sanchez Ridley hospitalist at [facility], she agreed to accept for admission, I later called Dr. Richi Ho cardiology service at [facility] to update him on the patient who is being transferred there, her troponin did improve from 0.07 to 0.05, I made some vent adjustments and her blood gases notably improved. -- Dr. Marianne Burger, he recommended transfer to [facility] ICU and Dr. Richi oH cardiology have been updated  --she was transported via air care to [facility]. Patient had code blue called 8/21/22 at 1622. Patient was noted to be in V fib on first pulse check and was given shock and obtained ROSC. Overnight, patient had an acute change in mental status where she became unresponsive to painful stimuli at 2200 on 8/21/22. Precedex was stopped and stat CT head was obtained which showed no change from previous CT. At around 0000 on 8/22, patient began to withdraw from pain again and Precedex was restarted. At around [address], patient's right foot began to look more pale than on previous assessments and pulses were unable to be obtained with doppler when they had been identified earlier this morning. R foot was cold and vascular surgery was consulted. On exam in the morning, again patient was no longer responsive to painful stimuli and Precedex drip was again stopped.  Both lower extremities were without pulses, R leg was mottled, pale, cold, worse than left leg. Legs were wrapped in warm blankets. On exam, patient was not responding to verbal or painful stimuli. Spoke with patient's oldest son Jeannine Sanchez regarding code status and goals of care. Patient states he wanted everything to continue to be done however if she were to lose pulses again he would not want her to go through CPR again. Code status was changed to DNR-CCA. Later in the evening on 22 patient again had change in neurological exam and repeat CT head showed increased edema and loss of sulci despite stable bleed. Patient was declared dead at [facility] [address] on 22. The following issues were addressed during hospitalization:    Intraparenchymal left-sided hemorrhage: neurology/neurosurgery were consulted. Q1H neurochecks, started on 3% saline, received Mannitol x2 doses, started on Keppra 1g BID and nicardiine drip to maintain SBP <160    Anterolateral STEMI: Cardiology was consulted. Patient was started on atorvastatin, metoprolol 2.5mg IV Q4H, and continuous telemetry. AP/AC was held given intracranial bleed    Acute limb ischemia: Vascular surgery was consulted however no intervention was able to be done given intracranial bleed and STEMI. Patient's legs were wrapped with warm blankets    Acute respiratory failure with hypoxia and hypercapnia: Patient ventilated and started on duonebs.      Pulmonary edema: Monitored I/Os, ordered echo    JORDI: Cr elevated from admission  Leukocytosis    Physical Exam:  Physical Exam   Patient was examined and the following were absent: Pulses, Blood Pressure, and Respiratory effort    Consults: Neurosurgery, Neurology, Cardiology, Vascular surgery  Significant Diagnostic Studies:  CT scan head  Treatments: IV hydration and respiratory therapy: Intubated  Disposition:   Discharged Condition:      DISCHARGE MEDICATION:       Medication List        ASK your doctor about these medications      gabapentin 100 MG capsule  Commonly known as: NEURONTIN  Take 1 capsule by mouth 3 times daily for 30 days.  Intended supply: 30 days             Time Spent on discharge is more than 30 minutes    Signed:  Claude Hrarington DO, MD, PGY-2  8/23/2022

## 2022-08-24 LAB
EKG ATRIAL RATE: 115 BPM
EKG ATRIAL RATE: 92 BPM
EKG DIAGNOSIS: NORMAL
EKG DIAGNOSIS: NORMAL
EKG P AXIS: 66 DEGREES
EKG P AXIS: 73 DEGREES
EKG P-R INTERVAL: 118 MS
EKG P-R INTERVAL: 128 MS
EKG Q-T INTERVAL: 312 MS
EKG Q-T INTERVAL: 418 MS
EKG QRS DURATION: 70 MS
EKG QRS DURATION: 80 MS
EKG QTC CALCULATION (BAZETT): 431 MS
EKG QTC CALCULATION (BAZETT): 516 MS
EKG R AXIS: 247 DEGREES
EKG R AXIS: 66 DEGREES
EKG T AXIS: 129 DEGREES
EKG T AXIS: 78 DEGREES
EKG VENTRICULAR RATE: 115 BPM
EKG VENTRICULAR RATE: 92 BPM

## 2022-08-24 PROCEDURE — 93010 ELECTROCARDIOGRAM REPORT: CPT | Performed by: INTERNAL MEDICINE

## 2024-09-15 NOTE — PROGRESS NOTES
Patient CTA is back with the following results:  Evolving large parenchymal hematoma centered in the left basal ganglia with interval slight increase in size. Slight worsening of adjacent vasogenic edema and mass-effect with subfalcine herniation and 6 mm midline shift to the right  No evidence of infarct cranial aneurysm, AVM or large vessel occlusion    Called Dr. Dee Poster and updated him regarding the change in CT a findings and also updated him that there is no neurological change. He recommended 25 g of mannitol stat and another dose at midnight.   Orders are in [facility]  Internal Medicine  [address] [place] [address], [address]  [place] [address]  Phone: [phone]  Fax: [phone]  Follow Up Time: